# Patient Record
Sex: FEMALE | Race: WHITE | NOT HISPANIC OR LATINO | Employment: PART TIME | URBAN - NONMETROPOLITAN AREA
[De-identification: names, ages, dates, MRNs, and addresses within clinical notes are randomized per-mention and may not be internally consistent; named-entity substitution may affect disease eponyms.]

---

## 2023-08-03 ENCOUNTER — OFFICE VISIT (OUTPATIENT)
Dept: INTERNAL MEDICINE | Facility: CLINIC | Age: 38
End: 2023-08-03
Payer: COMMERCIAL

## 2023-08-03 VITALS
SYSTOLIC BLOOD PRESSURE: 140 MMHG | RESPIRATION RATE: 16 BRPM | WEIGHT: 208 LBS | DIASTOLIC BLOOD PRESSURE: 81 MMHG | HEART RATE: 91 BPM | BODY MASS INDEX: 38.28 KG/M2 | HEIGHT: 62 IN | OXYGEN SATURATION: 99 % | TEMPERATURE: 98.1 F

## 2023-08-03 DIAGNOSIS — E66.01 CLASS 2 SEVERE OBESITY DUE TO EXCESS CALORIES WITH SERIOUS COMORBIDITY AND BODY MASS INDEX (BMI) OF 39.0 TO 39.9 IN ADULT: Primary | ICD-10-CM

## 2023-08-03 DIAGNOSIS — R03.0 ELEVATED BLOOD PRESSURE READING: ICD-10-CM

## 2023-08-03 DIAGNOSIS — R60.0 BILATERAL LOWER EXTREMITY EDEMA: ICD-10-CM

## 2023-08-03 PROCEDURE — 99213 OFFICE O/P EST LOW 20 MIN: CPT | Performed by: NURSE PRACTITIONER

## 2023-08-03 RX ORDER — HYDROCHLOROTHIAZIDE 12.5 MG/1
12.5 TABLET ORAL DAILY PRN
Qty: 90 TABLET | Refills: 0 | Status: SHIPPED | OUTPATIENT
Start: 2023-08-03

## 2023-08-03 NOTE — PROGRESS NOTES
Chief Complaint / Reason:      Chief Complaint   Patient presents with    Obesity     Discuss medications     Edema     Bilateral legs        Subjective     HPI  Patient presents today with complaints of obesity and would like to discuss medication options.  Patient states that she has tried for some time to lose weight but nothing seems to help.  Patient's BMI is 39.14.  Patient does report some lower extremity edema and blood pressure is elevated.  She denies chest pain, shortness of breath or heart palpitations.  History taken from: patient    PMH/FH/Social History were reviewed and updated appropriately in the electronic medical record.   Past Medical History:   Diagnosis Date    Anxiety     Bipolar 1 disorder     Hypothyroidism     Migraine     Shingles      Past Surgical History:   Procedure Laterality Date     SECTION       Social History     Socioeconomic History    Marital status:    Tobacco Use    Smoking status: Never    Smokeless tobacco: Never   Vaping Use    Vaping Use: Every day   Substance and Sexual Activity    Drug use: Yes     Types: Methamphetamines, Benzodiazepines, Heroin, Hydrocodone, Cocaine(coke), Marijuana     Comment: history of  IV drug use and snorting     Sexual activity: Defer     Family History   Problem Relation Age of Onset    Diabetes Mother     Other Mother     Diabetes Father        Review of Systems:   Review of Systems      All other systems were reviewed and are negative.  Exceptions are noted in the subjective or above.      Objective     Vital Signs  Vitals:    23 1610   BP: 140/81   Pulse: 91   Resp: 16   Temp: 98.1 øF (36.7 øC)   SpO2: 99%       Body mass index is 38.66 kg/mý.          Physical Exam  Vitals and nursing note reviewed.   Constitutional:       General: She is not in acute distress.     Appearance: She is well-developed. She is obese.   Cardiovascular:      Rate and Rhythm: Normal rate and regular rhythm.      Pulses: Normal pulses.       Heart sounds: Normal heart sounds.   Pulmonary:      Effort: Pulmonary effort is normal.      Breath sounds: Normal breath sounds. No wheezing.   Chest:      Chest wall: No tenderness.   Musculoskeletal:      Right lower leg: Edema present.      Left lower leg: Edema present.   Skin:     General: Skin is warm and dry.      Capillary Refill: Capillary refill takes less than 2 seconds.      Coloration: Skin is not pale.      Findings: No erythema or rash.   Neurological:      Mental Status: She is alert and oriented to person, place, and time.   Psychiatric:         Behavior: Behavior normal.         Thought Content: Thought content normal.         Judgment: Judgment normal.            Results Review:    I reviewed the patient's new clinical results.       Medication Review:     Current Outpatient Medications:     buPROPion XL (Wellbutrin XL) 150 MG 24 hr tablet, Take 1 tablet by mouth Every Morning., Disp: 30 tablet, Rfl: 2    Cariprazine HCl (Vraylar) 1.5 MG capsule capsule, Take 1 capsule by mouth Daily., Disp: 30 capsule, Rfl: 2    clonazePAM (KlonoPIN) 0.5 MG tablet, Take 1 tablet by mouth Daily As Needed for Anxiety., Disp: 15 tablet, Rfl: 2    Methylphenidate HCl ER, PM, (Jornay PM) 40 MG capsule sustained-release 24 hr, Take 40 mg by mouth Every Night., Disp: 30 capsule, Rfl: 0    traZODone (DESYREL) 50 MG tablet, Take 2 tablets by mouth Every Night., Disp: 60 tablet, Rfl: 2    vitamin D (ERGOCALCIFEROL) 1.25 MG (25062 UT) capsule capsule, Take 1 capsule by mouth 1 (One) Time Per Week., Disp: 12 capsule, Rfl: 0    Diagnoses and all orders for this visit:    Class 2 severe obesity due to excess calories with serious comorbidity and body mass index (BMI) of 39.0 to 39.9 in adult  Patient's (Body mass index is 38.66 kg/mý.) indicates that they are obese (BMI >30) with health related conditions that include hypertension, dyslipidemias, and GERD . Weight is unchanged. BMI is is above average; BMI management plan is  completed. We discussed low calorie, low carb based diet program, portion control, increasing exercise, joining a fitness center or start home based exercise program, Weight Watchers or other Commercial based weight reduction program, and management of depression/anxiety/stress to control compensatory eating.     Elevated blood pressure reading  -     hydroCHLOROthiazide (HYDRODIURIL) 12.5 MG tablet; Take 1 tablet by mouth Daily As Needed (swelling in lower extremities).  Initiate lifestyle modifications.   DASH Diet and exercise.   Compliance with medication regimen and discussed ways to prevent of long-term complications from high blood pressure.  Discussed when to seek medical attention.  Encouraged patient to take blood pressure daily and keep a log.    Bilateral lower extremity edema  -     hydroCHLOROthiazide (HYDRODIURIL) 12.5 MG tablet; Take 1 tablet by mouth Daily As Needed (swelling in lower extremities).  Advised patient to closely monitor blood pressure and elevate lower extremities recommend watching salt and caffeine intake and do daily weights.        Return in about 4 weeks (around 8/31/2023), or if symptoms worsen or fail to improve.    Ayesha Mathew, MAX  08/03/2023

## 2023-08-15 DIAGNOSIS — F41.1 GENERALIZED ANXIETY DISORDER: ICD-10-CM

## 2023-08-15 RX ORDER — CLONAZEPAM 0.5 MG/1
0.5 TABLET ORAL DAILY PRN
Qty: 15 TABLET | Refills: 2 | Status: SHIPPED | OUTPATIENT
Start: 2023-08-15

## 2023-08-15 NOTE — TELEPHONE ENCOUNTER
Rx Refill Note  Requested Prescriptions     Pending Prescriptions Disp Refills    clonazePAM (KlonoPIN) 0.5 MG tablet 15 tablet 2     Sig: Take 1 tablet by mouth Daily As Needed for Anxiety.      Last office visit with prescribing clinician: 6/6/2023   Last telemedicine visit with prescribing clinician: Visit date not found   Next office visit with prescribing clinician: 8/15/2023                         Would you like a call back once the refill request has been completed: [] Yes [] No    If the office needs to give you a call back, can they leave a voicemail: [] Yes [] No    Karina De Souza MA  08/15/23, 09:26 EDT

## 2023-08-20 ENCOUNTER — HOSPITAL ENCOUNTER (EMERGENCY)
Facility: HOSPITAL | Age: 38
Discharge: HOME | End: 2023-08-20
Attending: EMERGENCY MEDICINE | Admitting: EMERGENCY MEDICINE
Payer: COMMERCIAL

## 2023-08-20 ENCOUNTER — APPOINTMENT (OUTPATIENT)
Dept: GENERAL RADIOLOGY | Facility: HOSPITAL | Age: 38
End: 2023-08-20
Payer: COMMERCIAL

## 2023-08-20 VITALS
HEIGHT: 62 IN | OXYGEN SATURATION: 98 % | TEMPERATURE: 97.7 F | BODY MASS INDEX: 39.38 KG/M2 | SYSTOLIC BLOOD PRESSURE: 133 MMHG | RESPIRATION RATE: 18 BRPM | HEART RATE: 64 BPM | DIASTOLIC BLOOD PRESSURE: 91 MMHG | WEIGHT: 214 LBS

## 2023-08-20 DIAGNOSIS — M25.572 ACUTE LEFT ANKLE PAIN: Primary | ICD-10-CM

## 2023-08-20 PROCEDURE — 99282 EMERGENCY DEPT VISIT SF MDM: CPT

## 2023-08-20 PROCEDURE — 73610 X-RAY EXAM OF ANKLE: CPT

## 2023-08-20 PROCEDURE — 73630 X-RAY EXAM OF FOOT: CPT

## 2023-08-20 PROCEDURE — 96372 THER/PROPH/DIAG INJ SC/IM: CPT

## 2023-08-20 PROCEDURE — 25010000002 KETOROLAC TROMETHAMINE PER 15 MG: Performed by: EMERGENCY MEDICINE

## 2023-08-20 RX ORDER — KETOROLAC TROMETHAMINE 30 MG/ML
60 INJECTION, SOLUTION INTRAMUSCULAR; INTRAVENOUS ONCE
Status: COMPLETED | OUTPATIENT
Start: 2023-08-20 | End: 2023-08-20

## 2023-08-20 RX ORDER — KETOROLAC TROMETHAMINE 10 MG/1
10 TABLET, FILM COATED ORAL EVERY 8 HOURS
Qty: 9 TABLET | Refills: 0 | Status: SHIPPED | OUTPATIENT
Start: 2023-08-20 | End: 2023-08-23

## 2023-08-20 RX ADMIN — KETOROLAC TROMETHAMINE 60 MG: 30 INJECTION, SOLUTION INTRAMUSCULAR at 08:29

## 2023-08-20 NOTE — ED PROVIDER NOTES
TRIAGE CHIEF COMPLAINT:     Nursing and triage notes reviewed    Chief Complaint   Patient presents with    Foot Pain      HPI: Sarita Pace is a 37 y.o. female who presents to the emergency department complaining of left foot pain.  Patient states she has pain at the back of her left heel primarily when she flexes her ankle or walks.  The pain began yesterday.  She denies having a rash or swelling.  She denies any trauma or injury that she can recall.    REVIEW OF SYSTEMS: All other systems reviewed and are negative     PAST MEDICAL HISTORY:   Past Medical History:   Diagnosis Date    Anxiety     Bipolar 1 disorder     Hypothyroidism     Migraine     Shingles         FAMILY HISTORY:   Family History   Problem Relation Age of Onset    Diabetes Mother     Other Mother     Diabetes Father         SOCIAL HISTORY:   Social History     Socioeconomic History    Marital status:    Tobacco Use    Smoking status: Never    Smokeless tobacco: Never   Vaping Use    Vaping Use: Every day   Substance and Sexual Activity    Drug use: Yes     Types: Methamphetamines, Benzodiazepines, Heroin, Hydrocodone, Cocaine(coke), Marijuana     Comment: history of  IV drug use and snorting     Sexual activity: Defer        SURGICAL HISTORY:   Past Surgical History:   Procedure Laterality Date     SECTION          CURRENT MEDICATIONS:      Medication List        ASK your doctor about these medications      buPROPion  MG 24 hr tablet  Commonly known as: Wellbutrin XL  Take 1 tablet by mouth Every Morning.     Cariprazine HCl 1.5 MG capsule capsule  Commonly known as: Vraylar  Take 1 capsule by mouth Daily.     clonazePAM 0.5 MG tablet  Commonly known as: KlonoPIN  Take 1 tablet by mouth Daily As Needed for Anxiety.     hydroCHLOROthiazide 12.5 MG tablet  Commonly known as: HYDRODIURIL  Take 1 tablet by mouth Daily As Needed (swelling in lower extremities).     Jornay PM 40 MG capsule sustained-release 24  hr  Generic drug: Methylphenidate HCl ER (PM)  Take 40 mg by mouth Every Night.     traZODone 50 MG tablet  Commonly known as: DESYREL  Take 2 tablets by mouth Every Night.     vitamin D 1.25 MG (80520 UT) capsule capsule  Commonly known as: ERGOCALCIFEROL  Take 1 capsule by mouth 1 (One) Time Per Week.               ALLERGIES: Patient has no known allergies.     PHYSICAL EXAM:   VITAL SIGNS:   Vitals:    08/20/23 0746   BP: 135/90   Pulse: 80   Resp: 18   Temp: 97.7 øF (36.5 øC)   SpO2: 99%      CONSTITUTIONAL: Awake, oriented, appears nontoxic   HENT: Atraumatic, normocephalic, oral mucosa pink and moist, airway patent. Nares patent without drainage. External ears normal.   EYES: Conjunctivae clear  NECK: Trachea midline   CARDIOVASCULAR: Normal heart rate, Normal rhythm, No murmurs, rubs, gallops   PULMONARY/CHEST: Clear to auscultation, no rhonchi, wheezes, or rales. Symmetrical breath sounds.  ABDOMINAL: Nondistended, soft, nontender - no rebound or guarding.   NEUROLOGIC: Nonfocal, moving all four extremities, no gross sensory or motor deficits.   EXTREMITIES: No clubbing, cyanosis, or edema.  There is no significant swelling at the left foot or ankle.  There is no bony tenderness along the heel or the back of the foot.  There is mild discomfort with range of motion.  There is no pain with squeezing of the calf muscle causing plantarflexion.  Mild pain with dorsiflexion of the foot immediately posterior to the calcaneus.  No tenderness at all in the calf.  There is no swelling or bruising or redness.  SKIN: Warm, Dry, No erythema, No rash     ED COURSE / MEDICAL DECISION MAKING:   Sarita Pace is a 37 y.o. female who presents to the emergency department for evaluation of pain in the left heel.  Patient is nondistressed on arrival in the emergency department.  Vital signs are stable.  Exam does not reveal any obvious bony tenderness.  There is mild pain at dorsiflexion immediately posterior to the  calcaneus.  There is no pain in the calf.  There is no swelling, bruising, redness.    Differential diagnosis includes sprain, contusion, fracture among other etiologies.    Strays of the left foot and ankle was ordered for further evaluation of the patient's presentation.    Diagnostic information from other sources: Chart review    Interventions: Toradol    Narrative: Patient presents with left heel and foot discomfort.  Patient appears well.  No tenderness in the calf including no swelling or pain with palpation in this area so I have no suspicion for a DVT at this time.  There is no overlying erythema or swelling so I have no suspicion of infection.  Pain primarily with dorsiflexion and with walking.  I suspect likely sprain.  X-rays obtained and per radiology interpretation have not revealed any obvious acute abnormality to the bony structures of the foot or ankle.  Discussed all of this with patient.  Will treat symptomatically very strict return precautions.      DECISION TO DISCHARGE/ADMIT: see ED care timeline     FINAL IMPRESSION:   1 --ankle pain  2 --   3 --     Electronically signed by: Therese Walden MD, 8/20/2023 08:26 Therese Kovacs MD  08/20/23 2725

## 2023-08-31 ENCOUNTER — OFFICE VISIT (OUTPATIENT)
Dept: INTERNAL MEDICINE | Facility: CLINIC | Age: 38
End: 2023-08-31
Payer: COMMERCIAL

## 2023-08-31 VITALS
WEIGHT: 204 LBS | DIASTOLIC BLOOD PRESSURE: 84 MMHG | SYSTOLIC BLOOD PRESSURE: 126 MMHG | HEIGHT: 62 IN | OXYGEN SATURATION: 99 % | TEMPERATURE: 97.6 F | BODY MASS INDEX: 37.54 KG/M2 | HEART RATE: 98 BPM

## 2023-08-31 DIAGNOSIS — R03.0 ELEVATED BLOOD PRESSURE READING: Primary | ICD-10-CM

## 2023-08-31 DIAGNOSIS — R60.0 BILATERAL LOWER EXTREMITY EDEMA: ICD-10-CM

## 2023-08-31 PROCEDURE — 99213 OFFICE O/P EST LOW 20 MIN: CPT | Performed by: NURSE PRACTITIONER

## 2023-08-31 PROCEDURE — 1160F RVW MEDS BY RX/DR IN RCRD: CPT | Performed by: NURSE PRACTITIONER

## 2023-08-31 PROCEDURE — 1159F MED LIST DOCD IN RCRD: CPT | Performed by: NURSE PRACTITIONER

## 2023-08-31 NOTE — PROGRESS NOTES
Chief Complaint / Reason:      Chief Complaint   Patient presents with    Anxiety     Swelling has been Improving with meds. BP is under control.        Subjective     Anxiety      Patient states that her anxiety is up a little bit as she started a new job and having to deal with the public but she does see psych.  Denies SI or HI.  Patient presents today for follow-up regarding elevated blood pressure and swelling and states the medicine is doing well she states that she is not having to take it every day but she is able to move her rings on her finger and does not feel so swollen.  She denies chest pain, shortness of breath or heart palpitations.  History taken from: patient    PMH/FH/Social History were reviewed and updated appropriately in the electronic medical record.   Past Medical History:   Diagnosis Date    Anxiety     Bipolar 1 disorder     Hypothyroidism     Migraine     Shingles      Past Surgical History:   Procedure Laterality Date     SECTION       Social History     Socioeconomic History    Marital status:    Tobacco Use    Smoking status: Never    Smokeless tobacco: Never   Vaping Use    Vaping Use: Every day   Substance and Sexual Activity    Drug use: Yes     Types: Methamphetamines, Benzodiazepines, Heroin, Hydrocodone, Cocaine(coke), Marijuana     Comment: history of  IV drug use and snorting     Sexual activity: Defer     Family History   Problem Relation Age of Onset    Diabetes Mother     Other Mother     Diabetes Father        Review of Systems:   Review of Systems      All other systems were reviewed and are negative.  Exceptions are noted in the subjective or above.      Objective     Vital Signs  Vitals:    23 1033   BP: 126/84   Pulse: 98   Temp: 97.6 øF (36.4 øC)   SpO2: 99%       Body mass index is 37.3 kg/mý.          Physical Exam  Vitals and nursing note reviewed.   Constitutional:       General: She is not in acute distress.     Appearance: She is  well-developed. She is obese.   Cardiovascular:      Rate and Rhythm: Normal rate and regular rhythm.      Pulses: Normal pulses.      Heart sounds: Normal heart sounds.   Pulmonary:      Effort: Pulmonary effort is normal.      Breath sounds: Normal breath sounds. No wheezing.   Chest:      Chest wall: No tenderness.   Skin:     General: Skin is warm and dry.      Capillary Refill: Capillary refill takes less than 2 seconds.      Coloration: Skin is not pale.      Findings: No erythema or rash.   Neurological:      Mental Status: She is alert and oriented to person, place, and time.   Psychiatric:         Behavior: Behavior normal.         Thought Content: Thought content normal.         Judgment: Judgment normal.            Results Review:    I reviewed the patient's new clinical results.       Medication Review:     Current Outpatient Medications:     buPROPion XL (Wellbutrin XL) 150 MG 24 hr tablet, Take 1 tablet by mouth Every Morning., Disp: 30 tablet, Rfl: 2    Cariprazine HCl (Vraylar) 1.5 MG capsule capsule, Take 1 capsule by mouth Daily., Disp: 30 capsule, Rfl: 2    clonazePAM (KlonoPIN) 0.5 MG tablet, Take 1 tablet by mouth Daily As Needed for Anxiety., Disp: 15 tablet, Rfl: 2    hydroCHLOROthiazide (HYDRODIURIL) 12.5 MG tablet, Take 1 tablet by mouth Daily As Needed (swelling in lower extremities)., Disp: 90 tablet, Rfl: 0    Methylphenidate HCl ER, PM, (Jornay PM) 40 MG capsule sustained-release 24 hr, Take 40 mg by mouth Every Night., Disp: 30 capsule, Rfl: 0    traZODone (DESYREL) 50 MG tablet, Take 2 tablets by mouth Every Night., Disp: 60 tablet, Rfl: 2    vitamin D (ERGOCALCIFEROL) 1.25 MG (68220 UT) capsule capsule, Take 1 capsule by mouth 1 (One) Time Per Week., Disp: 12 capsule, Rfl: 0    Diagnoses and all orders for this visit:    Elevated blood pressure reading    Bilateral lower extremity edema    Improved blood pressure and swelling      Return if symptoms worsen or fail to  improve.    Ayesha Mathew, APRN  08/31/2023

## 2023-09-07 ENCOUNTER — OFFICE VISIT (OUTPATIENT)
Dept: BEHAVIORAL HEALTH | Facility: CLINIC | Age: 38
End: 2023-09-07
Payer: COMMERCIAL

## 2023-09-07 VITALS — WEIGHT: 203 LBS | BODY MASS INDEX: 37.36 KG/M2 | HEIGHT: 62 IN

## 2023-09-07 DIAGNOSIS — F51.04 PSYCHOPHYSIOLOGICAL INSOMNIA: ICD-10-CM

## 2023-09-07 DIAGNOSIS — Z51.81 ENCOUNTER FOR THERAPEUTIC DRUG MONITORING: ICD-10-CM

## 2023-09-07 DIAGNOSIS — F90.2 ATTENTION DEFICIT HYPERACTIVITY DISORDER, COMBINED TYPE: ICD-10-CM

## 2023-09-07 DIAGNOSIS — F41.1 GENERALIZED ANXIETY DISORDER: Primary | ICD-10-CM

## 2023-09-07 DIAGNOSIS — F31.9 BIPOLAR 1 DISORDER: ICD-10-CM

## 2023-09-07 RX ORDER — TRAZODONE HYDROCHLORIDE 50 MG/1
100 TABLET ORAL NIGHTLY
Qty: 60 TABLET | Refills: 1 | Status: SHIPPED | OUTPATIENT
Start: 2023-09-07

## 2023-09-07 RX ORDER — BUPROPION HYDROCHLORIDE 150 MG/1
150 TABLET ORAL EVERY MORNING
Qty: 30 TABLET | Refills: 1 | Status: SHIPPED | OUTPATIENT
Start: 2023-09-07

## 2023-09-07 RX ORDER — HYDROXYZINE HYDROCHLORIDE 25 MG/1
25 TABLET, FILM COATED ORAL NIGHTLY PRN
Qty: 30 TABLET | Refills: 1 | Status: SHIPPED | OUTPATIENT
Start: 2023-09-07

## 2023-09-07 RX ORDER — METHYLPHENIDATE HYDROCHLORIDE 40 MG/1
40 CAPSULE ORAL NIGHTLY
Qty: 30 CAPSULE | Refills: 0 | Status: SHIPPED | OUTPATIENT
Start: 2023-09-07

## 2023-09-07 RX ORDER — CLONAZEPAM 0.5 MG/1
0.5 TABLET ORAL DAILY PRN
Qty: 30 TABLET | Refills: 1 | Status: SHIPPED | OUTPATIENT
Start: 2023-09-07

## 2023-09-07 NOTE — PROGRESS NOTES
Follow Up Office Visit    Patient Name: Sarita Pace  : 1985   MRN: [id number]   Care Team: Patient Care Team:  Ayesha Mathew APRN as PCP - General (Family Medicine)  Belkys Barber APRN as Nurse Practitioner (Behavioral Health)         Chief Complaint:    Chief Complaint   Patient presents with    ADHD    Anxiety    Sleeping Problem    Bipolar    Med Management       History of Present Illness: Sarita Pace is a 37 y.o. female who is here today for a medication management follow up. Patient states that overall medication continues to be effective. She has started a new job and states that has been causing some increased anxiety and she has been having a hard time sleeping. She does feel that her focus and concentration are good and her mood is stable for the most part. She denies SI/HI today.     The following portion of the patient's history were reviewed and updated appropriately: allergies, current and past medications, family history, medical history and social history.  Subjective   Review of Systems:    Review of Systems   Psychiatric/Behavioral:  Positive for sleep disturbance and stress. The patient is nervous/anxious.    All other systems reviewed and are negative.    Current Medications:   Current Outpatient Medications   Medication Sig Dispense Refill    buPROPion XL (Wellbutrin XL) 150 MG 24 hr tablet Take 1 tablet by mouth Every Morning. 30 tablet 1    Cariprazine HCl (Vraylar) 1.5 MG capsule capsule Take 1 capsule by mouth Daily. 30 capsule 1    clonazePAM (KlonoPIN) 0.5 MG tablet Take 1 tablet by mouth Daily As Needed for Anxiety. 30 tablet 1    hydroCHLOROthiazide (HYDRODIURIL) 12.5 MG tablet Take 1 tablet by mouth Daily As Needed (swelling in lower extremities). 90 tablet 0    Methylphenidate HCl ER, PM, (Jornay PM) 40 MG capsule sustained-release 24 hr Take 40 mg by mouth Every Night. 30 capsule 0    traZODone (DESYREL) 50 MG tablet Take 2 tablets by  mouth Every Night. 60 tablet 1    vitamin D (ERGOCALCIFEROL) 1.25 MG (25604 UT) capsule capsule Take 1 capsule by mouth 1 (One) Time Per Week. 12 capsule 0    hydrOXYzine (ATARAX) 25 MG tablet Take 1 tablet by mouth At Night As Needed (Anxiety and/or sleep). 30 tablet 1     No current facility-administered medications for this visit.       Mental Status Exam:   Hygiene:   good  Cooperation:  Cooperative  Eye Contact:  Good  Psychomotor Behavior:  Appropriate  Affect:  Appropriate  Mood: normal and anxious  Speech:  Normal  Thought Process:  Goal directed and Linear  Thought Content:  Normal and Mood congruent  Suicidal:  None  Homicidal:  None  Hallucinations:  None  Delusion:  None  Memory:  Intact  Orientation:  Person, Place, Time, and Situation  Reliability:  good  Insight:  Good  Judgement:  Good  Impulse Control:  Good  Physical/Medical Issues:  Yes see chart      Objective   Vital Signs:   There were no vitals taken for this visit.      Assessment / Plan    Diagnoses and all orders for this visit:    1. Generalized anxiety disorder (Primary)  -     buPROPion XL (Wellbutrin XL) 150 MG 24 hr tablet; Take 1 tablet by mouth Every Morning.  Dispense: 30 tablet; Refill: 1  -     clonazePAM (KlonoPIN) 0.5 MG tablet; Take 1 tablet by mouth Daily As Needed for Anxiety.  Dispense: 30 tablet; Refill: 1  -     hydrOXYzine (ATARAX) 25 MG tablet; Take 1 tablet by mouth At Night As Needed (Anxiety and/or sleep).  Dispense: 30 tablet; Refill: 1    2. Bipolar 1 disorder  -     Cariprazine HCl (Vraylar) 1.5 MG capsule capsule; Take 1 capsule by mouth Daily.  Dispense: 30 capsule; Refill: 1    3. Attention deficit hyperactivity disorder, combined type  -     Methylphenidate HCl ER, PM, (Jornay PM) 40 MG capsule sustained-release 24 hr; Take 40 mg by mouth Every Night.  Dispense: 30 capsule; Refill: 0  -     Compliance Drug Analysis, Ur - Urine, Clean Catch    4. Psychophysiological insomnia  -     traZODone (DESYREL) 50 MG  tablet; Take 2 tablets by mouth Every Night.  Dispense: 60 tablet; Refill: 1  -     hydrOXYzine (ATARAX) 25 MG tablet; Take 1 tablet by mouth At Night As Needed (Anxiety and/or sleep).  Dispense: 30 tablet; Refill: 1    5. Encounter for therapeutic drug monitoring  -     Compliance Drug Analysis, Ur - Urine, Clean Catch       Will add Hydroxyzine nightly and continue all other medication as ordered. Obtained yearly urine drug screen and controlled substance agreement signed.     A psychological evaluation was conducted in order to assess past and current level of functioning. Areas assessed included, but were not limited to: perception of social support, perception of ability to face and deal with challenges in life (positive functioning), anxiety symptoms, depressive symptoms, perspective on beliefs/belief system, coping skills for stress, intelligence level,  Therapeutic rapport was established. Interventions conducted today were geared towards incorporating medication management along with support for continued therapy. Education was also provided as to the med management with this provider and what to expect in subsequent sessions.      We discussed risks, benefits, goals and side effects of the above medication and the patient was agreeable with the plan. Patient was educated on the importance of compliance with treatment and follow-up appointments. Patient is aware to contact the [facility] with any worsening of symptoms. To call for questions or concerns and return early if necessary. Patent is agreeable to go to the Emergency Department or call 911 should they begin SI/HI.      PHQ-2/PHQ-9: Depression Screening  Little Interest or Pleasure in Doing Things: 0-->not at all  Feeling Down, Depressed or Hopeless: 0-->not at all  PHQ-2 Total Score: 0  PHQ-9: Brief Depression Severity Measure Score: 0      PHQ-9 Score:   PHQ-9 Total Score: 0    Over the last two weeks, how often have you been bothered by the  following problems?  Feeling nervous, anxious or on edge: More than half the days  Not being able to stop or control worrying: Several days  Worrying too much about different things: Several days  Trouble Relaxing: Not at all  Being so restless that it is hard to sit still: Not at all  Becoming easily annoyed or irritable: Not at all  Feeling afraid as if something awful might happen: Not at all  PONCE 7 Total Score: 4  If you checked any problems, how difficult have these problems made it for you to do your work, take care of things at home, or get along with other people: Somewhat difficult        Screening for Adults With ADHD - (1-6)  1. How often do you have trouble wrapping up the final details of a project, once the challenging parts have been done?: Never  2. How often do you have difficulty getting things in order when you have to do a task that requires organization?: Never  3. How often do you have problems remembering appointments or obligations : Never  4. When you have a task that requires a lot of thought, how often do you avoid or delay getting started ?: Never  5. How often do you fidget or squirm with your hands or feet when you have to sit down for a long time?: Often  6. How often do you feel overly active and compelled to do things, like you were driven by a motor?: Rarely  7. How often do you make careless mistakes when you have to work on a boring or difficult project?: Never  8. How often do have difficulty keeping your attention when you are doing boring or repetitive work?: Never  9. How often do you have difficulty concentrating on what people say to you, even when they are speaking to you: Never  10.How often do you misplace or have difficulty finding things at home or at work?: Never  11.How often are you distracted by activity or noise around you?: Rarely  12.How often do you leave your seat in meetings or other situations in which you are expected to remain seated?: Never  13.How often  do you feel restless or fidgety?: Rarely  14.How often do you have difficulty unwinding and relaxing when you have time to yourself?: Never  15.How often do you find yourself talking too much when you are in social situations?: Never  16.When you’re in a conversation, how often do you find yourself finishing the sentences of the people you are talking to, before they can finish them themselves?: Never  17.How often do you have difficulty waiting your turn in situations when turn taking is required?: Never  18.How often do you interrupt others when they are busy?: Never        MEDS ORDERED DURING VISIT:  New Medications Ordered This Visit   Medications    buPROPion XL (Wellbutrin XL) 150 MG 24 hr tablet     Sig: Take 1 tablet by mouth Every Morning.     Dispense:  30 tablet     Refill:  1    Cariprazine HCl (Vraylar) 1.5 MG capsule capsule     Sig: Take 1 capsule by mouth Daily.     Dispense:  30 capsule     Refill:  1    clonazePAM (KlonoPIN) 0.5 MG tablet     Sig: Take 1 tablet by mouth Daily As Needed for Anxiety.     Dispense:  30 tablet     Refill:  1    Methylphenidate HCl ER, PM, (Jornay PM) 40 MG capsule sustained-release 24 hr     Sig: Take 40 mg by mouth Every Night.     Dispense:  30 capsule     Refill:  0    traZODone (DESYREL) 50 MG tablet     Sig: Take 2 tablets by mouth Every Night.     Dispense:  60 tablet     Refill:  1    hydrOXYzine (ATARAX) 25 MG tablet     Sig: Take 1 tablet by mouth At Night As Needed (Anxiety and/or sleep).     Dispense:  30 tablet     Refill:  1         Follow Up   Return in about 8 weeks (around 11/2/2023).  Patient was given instructions and counseling regarding her condition or for health maintenance advice. Please see specific information pulled into the AVS if appropriate.     TREATMENT PLAN/GOALS: Continue supportive psychotherapy efforts and medications as indicated. Treatment and medication options discussed during today's visit. Patient acknowledged and verbally  consented to continue with current treatment plan and was educated on the importance of compliance with treatment and follow-up appointments.    MEDICATION ISSUES:  Discussed medication options and treatment plan of prescribed medication as well as the risks, benefits, and side effects including potential falls, possible impaired driving and metabolic adversities among others. Patient is agreeable to call the office with any worsening of symptoms or onset of side effects. Patient is agreeable to call 911 or go to the nearest ER should he/she begin having SI/HI.        [facility]  [address] [place] [id number]  [phone]    September 7, 2023 08:20 EDT

## 2023-09-14 LAB — DRUGS UR: NORMAL

## 2023-11-06 DIAGNOSIS — F90.2 ATTENTION DEFICIT HYPERACTIVITY DISORDER, COMBINED TYPE: ICD-10-CM

## 2023-11-06 DIAGNOSIS — F41.1 GENERALIZED ANXIETY DISORDER: ICD-10-CM

## 2023-11-06 RX ORDER — CLONAZEPAM 0.5 MG/1
0.5 TABLET ORAL DAILY PRN
Qty: 30 TABLET | Refills: 1 | Status: SHIPPED | OUTPATIENT
Start: 2023-11-06

## 2023-11-06 RX ORDER — METHYLPHENIDATE HYDROCHLORIDE 40 MG/1
40 CAPSULE ORAL NIGHTLY
Qty: 30 CAPSULE | Refills: 0 | Status: SHIPPED | OUTPATIENT
Start: 2023-11-06

## 2023-11-21 ENCOUNTER — OFFICE VISIT (OUTPATIENT)
Dept: BEHAVIORAL HEALTH | Facility: CLINIC | Age: 38
End: 2023-11-21
Payer: COMMERCIAL

## 2023-11-21 VITALS — WEIGHT: 198 LBS | BODY MASS INDEX: 36.44 KG/M2 | HEIGHT: 62 IN

## 2023-11-21 DIAGNOSIS — F31.9 BIPOLAR 1 DISORDER: ICD-10-CM

## 2023-11-21 DIAGNOSIS — F51.04 PSYCHOPHYSIOLOGICAL INSOMNIA: ICD-10-CM

## 2023-11-21 DIAGNOSIS — F90.2 ATTENTION DEFICIT HYPERACTIVITY DISORDER, COMBINED TYPE: Primary | ICD-10-CM

## 2023-11-21 DIAGNOSIS — F41.1 GENERALIZED ANXIETY DISORDER: ICD-10-CM

## 2023-11-21 PROCEDURE — 1160F RVW MEDS BY RX/DR IN RCRD: CPT

## 2023-11-21 PROCEDURE — 99214 OFFICE O/P EST MOD 30 MIN: CPT

## 2023-11-21 PROCEDURE — 1159F MED LIST DOCD IN RCRD: CPT

## 2023-11-21 NOTE — PROGRESS NOTES
Follow Up Office Visit    Patient Name: Sarita Pace  : 1985   MRN: [id number]   Care Team: Patient Care Team:  Ayesha Mathew APRN as PCP - General (Family Medicine)  Belkys Barber APRN as Nurse Practitioner (Behavioral Health)         Chief Complaint:    Chief Complaint   Patient presents with    ADHD    Bipolar    Anxiety    Sleeping Problem    Med Management       History of Present Illness: Sarita Pace is a 38 y.o. female who is here today for a medication management follow up. Patient reports that things have been going really well. She feels that her focus and task completion are doing good and her mood and anxiety are managed well. She also feels that she is sleeping good. She states that she has been doing so well she is ready to start weaning off medication. She denies SI/HI today.     The following portion of the patient's history were reviewed and updated appropriately: allergies, current and past medications, family history, medical history and social history.  Subjective   Review of Systems:    Review of Systems   All other systems reviewed and are negative.      Current Medications:   Current Outpatient Medications   Medication Sig Dispense Refill    hydroCHLOROthiazide (HYDRODIURIL) 12.5 MG tablet Take 1 tablet by mouth Daily As Needed (swelling in lower extremities). 90 tablet 0    vitamin D (ERGOCALCIFEROL) 1.25 MG (40471 UT) capsule capsule Take 1 capsule by mouth 1 (One) Time Per Week. 12 capsule 0     No current facility-administered medications for this visit.       Mental Status Exam:   Hygiene:   good  Cooperation:  Cooperative  Eye Contact:  Good  Psychomotor Behavior:  Appropriate  Affect:  Appropriate  Mood: normal  Speech:  Normal  Thought Process:  Goal directed and Linear  Thought Content:  Normal and Mood congruent  Suicidal:  None  Homicidal:  None  Hallucinations:  None  Delusion:  None  Memory:  Intact  Orientation:  Person, Place,  "Time, and Situation  Reliability:  fair  Insight:  Fair  Judgement:  Fair  Impulse Control:  Fair  Physical/Medical Issues:  Yes see chart       Objective   Vital Signs:   Ht 157.5 cm (62\")   Wt 89.8 kg (198 lb)   BMI 36.21 kg/m²       Assessment / Plan    Diagnoses and all orders for this visit:    1. Attention deficit hyperactivity disorder, combined type (Primary)    2. Bipolar 1 disorder    3. Generalized anxiety disorder    4. Psychophysiological insomnia       Discussed that often times patient's are doing well because of the medication and when we wean off they decline. She did not feel that her progress was medication related. She states she has started using THC and that has been helping more than anything. Provided instructions on weaning off the Vraylar, Wellbutrin and Jornay. She had already stopped everything else. Will discontinue Klonopin, Hydroxyzine, Trazodone, Vraylar, Wellbutrin and Jornay.     A psychological evaluation was conducted in order to assess past and current level of functioning. Areas assessed included, but were not limited to: perception of social support, perception of ability to face and deal with challenges in life (positive functioning), anxiety symptoms, depressive symptoms, perspective on beliefs/belief system, coping skills for stress, intelligence level,  Therapeutic rapport was established. Interventions conducted today were geared towards incorporating medication management along with support for continued therapy. Education was also provided as to the med management with this provider and what to expect in subsequent sessions.      We discussed risks, benefits, goals and side effects of the above medication and the patient was agreeable with the plan. Patient was educated on the importance of compliance with treatment and follow-up appointments. Patient is aware to contact the [facility] with any worsening of symptoms. To call for questions or concerns and return " early if necessary. Patent is agreeable to go to the Emergency Department or call 911 should they begin SI/HI.      PHQ-2/PHQ-9: Depression Screening  Little Interest or Pleasure in Doing Things: 0-->not at all  Feeling Down, Depressed or Hopeless: 0-->not at all  PHQ-2 Total Score: 0  PHQ-9: Brief Depression Severity Measure Score: 0      PHQ-9 Score:   PHQ-9 Total Score: 0      Over the last two weeks, how often have you been bothered by the following problems?  Feeling nervous, anxious or on edge: Not at all  Not being able to stop or control worrying: Not at all  Worrying too much about different things: Not at all  Trouble Relaxing: Not at all  Being so restless that it is hard to sit still: Not at all  Becoming easily annoyed or irritable: Not at all  Feeling afraid as if something awful might happen: Not at all  PONCE 7 Total Score: 0        Screening for Adults With ADHD - (1-6)  1. How often do you have trouble wrapping up the final details of a project, once the challenging parts have been done?: Rarely  2. How often do you have difficulty getting things in order when you have to do a task that requires organization?: Never  3. How often do you have problems remembering appointments or obligations : Never  4. When you have a task that requires a lot of thought, how often do you avoid or delay getting started ?: Never  5. How often do you fidget or squirm with your hands or feet when you have to sit down for a long time?: Never  6. How often do you feel overly active and compelled to do things, like you were driven by a motor?: Never  7. How often do you make careless mistakes when you have to work on a boring or difficult project?: Never  8. How often do have difficulty keeping your attention when you are doing boring or repetitive work?: Never  9. How often do you have difficulty concentrating on what people say to you, even when they are speaking to you: Never  10.How often do you misplace or have  difficulty finding things at home or at work?: Never  11.How often are you distracted by activity or noise around you?: Never  12.How often do you leave your seat in meetings or other situations in which you are expected to remain seated?: Never  13.How often do you feel restless or fidgety?: Never  14.How often do you have difficulty unwinding and relaxing when you have time to yourself?: Never  15.How often do you find yourself talking too much when you are in social situations?: Never  16.When you’re in a conversation, how often do you find yourself finishing the sentences of the people you are talking to, before they can finish them themselves?: Never  17.How often do you have difficulty waiting your turn in situations when turn taking is required?: Never  18.How often do you interrupt others when they are busy?: Never        MEDS ORDERED DURING VISIT:  No orders of the defined types were placed in this encounter.        Follow Up   Return in about 8 weeks (around 1/16/2024).  Patient was given instructions and counseling regarding her condition or for health maintenance advice. Please see specific information pulled into the AVS if appropriate.     TREATMENT PLAN/GOALS: Continue supportive psychotherapy efforts and medications as indicated. Treatment and medication options discussed during today's visit. Patient acknowledged and verbally consented to continue with current treatment plan and was educated on the importance of compliance with treatment and follow-up appointments.    MEDICATION ISSUES:  Discussed medication options and treatment plan of prescribed medication as well as the risks, benefits, and side effects including potential falls, possible impaired driving and metabolic adversities among others. Patient is agreeable to call the office with any worsening of symptoms or onset of side effects. Patient is agreeable to call 911 or go to the nearest ER should he/she begin having SI/HI.        Belkys  [facility]  [address] [address] [phone]    November 21, 2023 11:11 EST

## 2024-03-14 ENCOUNTER — OFFICE VISIT (OUTPATIENT)
Dept: INTERNAL MEDICINE | Facility: CLINIC | Age: 39
End: 2024-03-14
Payer: COMMERCIAL

## 2024-03-14 ENCOUNTER — TELEPHONE (OUTPATIENT)
Dept: BEHAVIORAL HEALTH | Facility: CLINIC | Age: 39
End: 2024-03-14
Payer: COMMERCIAL

## 2024-03-14 VITALS
TEMPERATURE: 97.2 F | DIASTOLIC BLOOD PRESSURE: 80 MMHG | BODY MASS INDEX: 38.64 KG/M2 | HEIGHT: 62 IN | SYSTOLIC BLOOD PRESSURE: 138 MMHG | OXYGEN SATURATION: 98 % | HEART RATE: 110 BPM | WEIGHT: 210 LBS

## 2024-03-14 DIAGNOSIS — R11.2 NAUSEA AND VOMITING, UNSPECIFIED VOMITING TYPE: ICD-10-CM

## 2024-03-14 DIAGNOSIS — R10.13 EPIGASTRIC PAIN: Primary | ICD-10-CM

## 2024-03-14 PROCEDURE — 99214 OFFICE O/P EST MOD 30 MIN: CPT | Performed by: NURSE PRACTITIONER

## 2024-03-14 PROCEDURE — 1159F MED LIST DOCD IN RCRD: CPT | Performed by: NURSE PRACTITIONER

## 2024-03-14 PROCEDURE — 1160F RVW MEDS BY RX/DR IN RCRD: CPT | Performed by: NURSE PRACTITIONER

## 2024-03-14 RX ORDER — HYDROXYZINE HYDROCHLORIDE 25 MG/1
25 TABLET, FILM COATED ORAL 2 TIMES DAILY PRN
Qty: 30 TABLET | Refills: 1 | Status: SHIPPED | OUTPATIENT
Start: 2024-03-14 | End: 2024-03-14

## 2024-03-14 RX ORDER — OMEPRAZOLE 40 MG/1
40 CAPSULE, DELAYED RELEASE ORAL DAILY
Qty: 90 CAPSULE | Refills: 0 | Status: SHIPPED | OUTPATIENT
Start: 2024-03-14

## 2024-03-14 RX ORDER — ONDANSETRON 4 MG/1
4 TABLET, ORALLY DISINTEGRATING ORAL EVERY 8 HOURS PRN
Qty: 30 TABLET | Refills: 0 | Status: SHIPPED | OUTPATIENT
Start: 2024-03-14

## 2024-03-14 NOTE — TELEPHONE ENCOUNTER
PT CALLED AND SCHEDULED APPT. SHE STATED  THAT SHE HAD STOP TAKING HER MEDICATION BUT SHE IS REALLY NEEDING IT. SHE WAS TAKING KLONOPIN 0.5 . I SCHEDULED HER FOR MAY 6 @ 11:45. PLEASE LET PT KNOW.

## 2024-03-14 NOTE — TELEPHONE ENCOUNTER
Patient having flare ups of anxiety, requesting a PRN medication to help with that. I informed her that she will need to be seen in order to prescribe any controlled substances again. Patient denies any thoughts of hurting herself or others.    Let her know I will discuss with Charmaine to see if we can send in a non controlled PRN medication and if there are any sooner appointments.

## 2024-03-17 NOTE — PROGRESS NOTES
Office Visit      Date: 2024   Patient Name: Sarita Pace  : 1985   MRN: [id number]     Chief Complaint:    Chief Complaint   Patient presents with    Abdominal Pain     Upper mid abdomen for a little over a Month.     Nausea    Bloated    Vomiting     Off and on       History of Present Illness: Sarita Pace is a 38 y.o. female presents for epigastric abdominal pain. Onset little over 1 month. Associated symptoms: nausea, intermittent vomiting, bloating. No diarrhea, constipation, melena, hematochezia, hematemesis. Characteristics: dull ache. Constant. No heartburn, reflux, fever, myalgia, chills. Diet is unhealthy. Drinks soda. Vapes daily.     Subjective      Review of Systems:   Pertinent ROS noted in HPI.     I have reviewed the patients family history, social history, past medical history, past surgical history and have updated it as appropriate.     Medications:     Current Outpatient Medications:     omeprazole (priLOSEC) 40 MG capsule, Take 1 capsule by mouth Daily., Disp: 90 capsule, Rfl: 0    ondansetron ODT (ZOFRAN-ODT) 4 MG disintegrating tablet, Place 1 tablet on the tongue Every 8 (Eight) Hours As Needed for Nausea or Vomiting., Disp: 30 tablet, Rfl: 0    Allergies:   No Known Allergies    Objective     Physical Exam  Physical Exam  Vitals and nursing note reviewed.   Constitutional:       General: She is not in acute distress.     Appearance: Normal appearance. She is obese.   HENT:      Right Ear: External ear normal.      Left Ear: External ear normal.   Eyes:      Extraocular Movements: Extraocular movements intact.   Cardiovascular:      Rate and Rhythm: Normal rate and regular rhythm.   Pulmonary:      Effort: Pulmonary effort is normal.      Breath sounds: Normal breath sounds.   Abdominal:      General: Bowel sounds are normal.      Palpations: Abdomen is soft.      Tenderness:  in the epigastric area There is no guarding or rebound. Negative signs  "include Aguero's sign and McBurney's sign.      Hernia: No hernia is present.   Musculoskeletal:         General: Normal range of motion.      Cervical back: Normal range of motion.   Skin:     General: Skin is dry.   Neurological:      Mental Status: She is alert and oriented to person, place, and time.   Psychiatric:         Mood and Affect: Mood normal.         Vital Signs:   Vitals:    03/14/24 1619   BP: 138/80   Pulse: 110   Temp: 97.2 °F (36.2 °C)   SpO2: 98%   Weight: 95.3 kg (210 lb)   Height: 157.5 cm (62\")   PainSc:   8     Body mass index is 38.41 kg/m².      Assessment / Plan      Assessment/Plan:   Problem List Items Addressed This Visit    None  Visit Diagnoses       Epigastric pain    -  Primary    Relevant Medications    omeprazole (priLOSEC) 40 MG capsule    Other Relevant Orders    US Gallbladder    Nausea and vomiting, unspecified vomiting type        Relevant Medications    ondansetron ODT (ZOFRAN-ODT) 4 MG disintegrating tablet    Other Relevant Orders    US Gallbladder              Differentials discussed; will treat for ulcer. Initiate omeprazole and zofran PRN. Take omeprazole first thing in AM 30 minutes prior to meals. Anti-reflux measures, trigger food and drinks to avoid; Fatty foods, alcohol, chocolate, coffee, tea, caffeinated soft drinks (decaffeinated coffee still has some caffeine), peppermint and spearmint, spices and vinegar, citrus fruits and juices, tomatoes and tomato sauces, and smoking. Other antireflux measures include weight reduction if overweight, avoid tight clothing around the abdomen, elevate the head of the bed 6 inches (may us a bed wedge which is placed between the mattress and box springs) or blocks under the heard of the bed. Don't drink or eat for 2 hours before going to bed and avoid lying down immediately after meals. US gallbladder ordered to r/o in case symptoms persist/worsen.       Follow Up:   Return in about 6 weeks (around 4/25/2024).        Sweta Paige " MAX  [facility]

## 2024-04-18 ENCOUNTER — HOSPITAL ENCOUNTER (OUTPATIENT)
Dept: ULTRASOUND IMAGING | Facility: HOSPITAL | Age: 39
Discharge: HOME | End: 2024-04-18
Admitting: NURSE PRACTITIONER
Payer: COMMERCIAL

## 2024-04-18 DIAGNOSIS — R11.2 NAUSEA AND VOMITING, UNSPECIFIED VOMITING TYPE: ICD-10-CM

## 2024-04-18 DIAGNOSIS — R10.13 EPIGASTRIC PAIN: ICD-10-CM

## 2024-04-18 PROCEDURE — 76705 ECHO EXAM OF ABDOMEN: CPT

## 2024-04-18 NOTE — PROGRESS NOTES
Gallbladder ultrasound is normal  During patient's appointment with me, we started her on omeprazole to see if this would help with her symptoms.  Is she continuing to have symptoms or are they better?  If she is continuing to have symptoms I would recommend a HIDA scan which will check the functionality of the gallbladder.

## 2024-05-06 ENCOUNTER — OFFICE VISIT (OUTPATIENT)
Dept: INTERNAL MEDICINE | Facility: CLINIC | Age: 39
End: 2024-05-06
Payer: COMMERCIAL

## 2024-05-06 VITALS
SYSTOLIC BLOOD PRESSURE: 134 MMHG | DIASTOLIC BLOOD PRESSURE: 84 MMHG | WEIGHT: 207 LBS | HEART RATE: 89 BPM | RESPIRATION RATE: 17 BRPM | TEMPERATURE: 98.3 F | HEIGHT: 62 IN | BODY MASS INDEX: 38.09 KG/M2 | OXYGEN SATURATION: 98 %

## 2024-05-06 DIAGNOSIS — R10.13 EPIGASTRIC PAIN: Primary | ICD-10-CM

## 2024-05-06 DIAGNOSIS — R03.0 ELEVATED BLOOD PRESSURE READING: ICD-10-CM

## 2024-05-06 DIAGNOSIS — R11.2 NAUSEA AND VOMITING, UNSPECIFIED VOMITING TYPE: ICD-10-CM

## 2024-05-06 DIAGNOSIS — K76.0 STEATOSIS OF LIVER: ICD-10-CM

## 2024-05-06 PROCEDURE — 1125F AMNT PAIN NOTED PAIN PRSNT: CPT | Performed by: NURSE PRACTITIONER

## 2024-05-06 PROCEDURE — 1159F MED LIST DOCD IN RCRD: CPT | Performed by: NURSE PRACTITIONER

## 2024-05-06 PROCEDURE — 99214 OFFICE O/P EST MOD 30 MIN: CPT | Performed by: NURSE PRACTITIONER

## 2024-05-06 PROCEDURE — 1160F RVW MEDS BY RX/DR IN RCRD: CPT | Performed by: NURSE PRACTITIONER

## 2024-05-06 NOTE — PROGRESS NOTES
Chief Complaint / Reason:      Chief Complaint   Patient presents with    Abstract     Discuss scans from gallbladder        Subjective     HPI    History taken from: patient    PMH/FH/Social History were reviewed and updated appropriately in the electronic medical record    The patient is a 38-year-old female who is here for follow-up and would like to discuss results from gallbladder. The patient's BMI is 37.6 kg/m2.    The patient is doing well.    The patient continues to experience gastrointestinal issues, characterized by a sensation akin to overeating, particularly upon awakening. She is currently on a daily regimen of Prilosec and Zofran as required.    The patient has been experiencing elevated blood pressure recently, which she attributes to stress. She has made significant dietary modifications, including a reduction in her caffeine intake from coffee to one Dr. Pepper per day, since her last visit.    Past Medical History:   Diagnosis Date    Anxiety     Bipolar 1 disorder     Hypothyroidism     Migraine     Shingles      Past Surgical History:   Procedure Laterality Date     SECTION       Social History     Socioeconomic History    Marital status:    Tobacco Use    Smoking status: Never    Smokeless tobacco: Never   Vaping Use    Vaping status: Every Day   Substance and Sexual Activity    Drug use: Yes     Types: Methamphetamines, Benzodiazepines, Heroin, Hydrocodone, Cocaine(coke), Marijuana     Comment: history of  IV drug use and snorting     Sexual activity: Defer     Family History   Problem Relation Age of Onset    Diabetes Mother     Other Mother     Diabetes Father        Review of Systems:   Review of Systems      All other systems were reviewed and are negative.  Exceptions are noted in the subjective or above.      Objective     Vital Signs  Vitals:    24 1717   BP: 134/84   Pulse: 89   Resp: 17   Temp: 98.3 °F (36.8 °C)   SpO2: 98%       Body mass index is 37.86  kg/m².  Class 2 Severe Obesity (BMI >=35 and <=39.9). Obesity-related health conditions include the following: dyslipidemias and GERD. Obesity is unchanged. BMI is is above average; BMI management plan is completed. We discussed low calorie, low carb based diet program, portion control, increasing exercise, joining a fitness center or start home based exercise program, Weight Watchers or other Commercial based weight reduction program, and management of depression/anxiety/stress to control compensatory eating.       Physical Exam  Vitals and nursing note reviewed.   Constitutional:       General: She is not in acute distress.     Appearance: She is well-developed.   Cardiovascular:      Rate and Rhythm: Normal rate and regular rhythm.      Pulses: Normal pulses.      Heart sounds: Normal heart sounds.   Pulmonary:      Effort: Pulmonary effort is normal.      Breath sounds: Normal breath sounds. No wheezing.   Chest:      Chest wall: No tenderness.   Abdominal:      General: Bowel sounds are normal.      Palpations: Abdomen is soft.      Tenderness: There is abdominal tenderness in the right upper quadrant and epigastric area. There is no right CVA tenderness, left CVA tenderness, guarding or rebound. Negative signs include Aguero's sign, Rovsing's sign, McBurney's sign, psoas sign and obturator sign.   Skin:     General: Skin is warm and dry.      Capillary Refill: Capillary refill takes less than 2 seconds.      Coloration: Skin is not pale.      Findings: No erythema or rash.   Neurological:      Mental Status: She is alert and oriented to person, place, and time.   Psychiatric:         Behavior: Behavior normal.         Thought Content: Thought content normal.         Judgment: Judgment normal.              Results Review:    I reviewed the patient's new clinical results.       Medication Review:     Current Outpatient Medications:     omeprazole (priLOSEC) 40 MG capsule, Take 1 capsule by mouth Daily., Disp: 90  capsule, Rfl: 0    ondansetron ODT (ZOFRAN-ODT) 4 MG disintegrating tablet, Place 1 tablet on the tongue Every 8 (Eight) Hours As Needed for Nausea or Vomiting., Disp: 30 tablet, Rfl: 0    Assessment & Plan   Diagnoses and all orders for this visit:    1. Epigastric pain (Primary)  -     Ambulatory Referral to Gastroenterology    2. Nausea and vomiting, unspecified vomiting type  -     Ambulatory Referral to Gastroenterology    3. Steatosis of liver  -     Ambulatory Referral to Gastroenterology    4. Elevated blood pressure reading      1. Nausea, vomiting, and epigastric pain.  - The patient is advised to adjust the timing of her Prilosec dosage to nighttime, and to revert to the previous dosage if ineffective. A referral to a gastroenterologist will be made for further evaluation.    2. Elevated blood pressure.  - The patient is advised to maintain a balanced diet, engage in regular exercise, and ensure adequate sleep.    Return if symptoms worsen or fail to improve.        Transcribed from ambient dictation for MAX Jiménez by Lani De Souza.  05/06/24   17:52 EDT    Patient or patient representative verbalized consent to the visit recording.  I have personally performed the services described in this document as transcribed by the above individual, and it is both accurate and complete.

## 2024-08-13 ENCOUNTER — OFFICE VISIT (OUTPATIENT)
Dept: GASTROENTEROLOGY | Facility: CLINIC | Age: 39
End: 2024-08-13
Payer: COMMERCIAL

## 2024-08-13 VITALS
SYSTOLIC BLOOD PRESSURE: 120 MMHG | BODY MASS INDEX: 36.58 KG/M2 | DIASTOLIC BLOOD PRESSURE: 80 MMHG | HEART RATE: 68 BPM | OXYGEN SATURATION: 98 % | WEIGHT: 200 LBS

## 2024-08-13 DIAGNOSIS — R10.13 EPIGASTRIC PAIN: Primary | Chronic | ICD-10-CM

## 2024-08-13 DIAGNOSIS — K21.9 GASTROESOPHAGEAL REFLUX DISEASE, UNSPECIFIED WHETHER ESOPHAGITIS PRESENT: Chronic | ICD-10-CM

## 2024-08-13 DIAGNOSIS — K76.0 FATTY (CHANGE OF) LIVER, NOT ELSEWHERE CLASSIFIED: Chronic | ICD-10-CM

## 2024-08-13 DIAGNOSIS — R11.2 NAUSEA AND VOMITING, UNSPECIFIED VOMITING TYPE: Chronic | ICD-10-CM

## 2024-08-13 DIAGNOSIS — E66.09 CLASS 2 OBESITY DUE TO EXCESS CALORIES WITHOUT SERIOUS COMORBIDITY WITH BODY MASS INDEX (BMI) OF 36.0 TO 36.9 IN ADULT: Chronic | ICD-10-CM

## 2024-08-13 DIAGNOSIS — F12.90 MARIJUANA USE: Chronic | ICD-10-CM

## 2024-08-13 PROBLEM — E66.812 CLASS 2 OBESITY DUE TO EXCESS CALORIES WITHOUT SERIOUS COMORBIDITY WITH BODY MASS INDEX (BMI) OF 36.0 TO 36.9 IN ADULT: Chronic | Status: ACTIVE | Noted: 2024-08-13

## 2024-08-13 RX ORDER — SODIUM CHLORIDE 9 MG/ML
70 INJECTION, SOLUTION INTRAVENOUS CONTINUOUS PRN
OUTPATIENT
Start: 2024-08-13

## 2024-08-13 NOTE — PROGRESS NOTES
New Patient Consult      Date: 2024   Patient Name: Sarita Pace  MRN: [id number]  : 1985     Primary Care Provider: Ayesha Mathew APRN    Chief Complaint   Patient presents with    Abdominal Pain     History of Present Illness: Sarita Pace is a 38 y.o. female who is here today to establish care with gastroenterology for abdominal pain.     She has been having upper abdominal pain with nausea since 2024 that occurs several times per week. She constantly feels like she has overeaten. She has had a few episodes of vomiting. Denies any hematemesis or melena. She has been on Omeprazole 40 mg daily since 2024 but it hasn't helped. She has occasional reflux for several years but it hasn't helped. Denies any NSAID use. She does smoke marijuana almost every weekend or every other weekend.    She denies any constipation, diarrhea or GI bleeding. She has not had a colonoscopy or EGD in the past. No family history of GI malignancy.     Subjective      Past Medical History:   Diagnosis Date    Anxiety     Bipolar 1 disorder     Hypothyroidism     Migraine     Shingles      Past Surgical History:   Procedure Laterality Date     SECTION      TONSILLECTOMY N/A     WISDOM TOOTH EXTRACTION N/A      Family History   Problem Relation Age of Onset    Diabetes Mother     Diabetes Father     Colon cancer Neg Hx      Social History     Socioeconomic History    Marital status:    Tobacco Use    Smoking status: Never    Smokeless tobacco: Never   Vaping Use    Vaping status: Every Day   Substance and Sexual Activity    Drug use: Yes     Types: Methamphetamines, Benzodiazepines, Heroin, Hydrocodone, Cocaine(coke), Marijuana     Comment: history of  IV drug use and snorting - currently uses marijuana    Sexual activity: Defer       Current Outpatient Medications:     omeprazole (priLOSEC) 40 MG capsule, Take 1 capsule by mouth Daily., Disp: 90 capsule, Rfl:  0    ondansetron ODT (ZOFRAN-ODT) 4 MG disintegrating tablet, Place 1 tablet on the tongue Every 8 (Eight) Hours As Needed for Nausea or Vomiting., Disp: 30 tablet, Rfl: 0     No Known Allergies    The following portions of the patient's history were reviewed and updated as appropriate: allergies, current medications, past family history, past medical history, past social history, past surgical history and problem list.    Objective     Physical Exam  Vitals and nursing note reviewed.   Constitutional:       General: She is not in acute distress.     Appearance: Normal appearance. She is well-developed.   HENT:      Head: Normocephalic and atraumatic.      Mouth/Throat:      Mouth: Mucous membranes are not pale, not dry and not cyanotic.   Eyes:      General: Lids are normal.   Neck:      Trachea: Trachea normal.   Cardiovascular:      Rate and Rhythm: Normal rate.   Pulmonary:      Effort: Pulmonary effort is normal. No respiratory distress.      Breath sounds: Normal breath sounds.   Abdominal:      General: Bowel sounds are normal.      Palpations: Abdomen is soft.      Tenderness: There is no abdominal tenderness.   Skin:     General: Skin is warm and dry.   Neurological:      Mental Status: She is alert and oriented to person, place, and time.   Psychiatric:         Mood and Affect: Mood normal.         Speech: Speech normal.         Behavior: Behavior normal. Behavior is cooperative.       Vitals:    08/13/24 0835   BP: 120/80   Pulse: 68   SpO2: 98%   Weight: 90.7 kg (200 lb)     Body mass index is 36.58 kg/m².     Results Review:   I have reviewed the patient's new clinical and imaging results.    No visits with results within 90 Day(s) from this visit.   Latest known visit with results is:   Office Visit on 09/07/2023   Component Date Value Ref Range Status    Report Summary 09/07/2023 FINAL   Final    Comment: ====================================================================  [facility] DRUG  ANALYSIS,[facility]  ====================================================================  Test                             Result       Flag       Units  Drug Present and Declared for Prescription Verification    7-aminoclonazepam              36           EXPECTED   ng/mg creat     7-aminoclonazepam is an expected metabolite of clonazepam. Source     of clonazepam is a scheduled prescription medication.    Methylphenidate                PRESENT      EXPECTED    Ritalinic Acid                 PRESENT      EXPECTED     Source of methylphenidate is a scheduled prescription medication.     Ritalinic acid is an expected metabolite of methylphenidate.    Bupropion                      PRESENT      EXPECTED    Hydroxybupropion               PRESENT      EXPECTED     Hydroxybupropion is an expected metabolite of bupropion.    Trazodone                      PRESENT      EXPECTED    1,3 chlorophenyl piperazine                               PRESENT      EXPECTED     1,3-chlorophenyl piperazine is an expected metabolite of     trazodone.  Drug Present not Declared for Prescription Verification    Carboxy-THC                    31           UNEXPECTED ng/mg creat     Carboxy-THC is a metabolite of tetrahydrocannabinol (THC). Source     of THC is most commonly herbal marijuana or marijuana-based     products, but THC is also present in a scheduled prescription     medication. Trace amounts of THC can be present in hemp and     cannabidiol (CBD) products. This test is not intended to     distinguish between delta-9-tetrahydrocannabinol, the predominant     form of THC in most herbal or marijuana-based products, and     delta-8-tetrahydrocannabinol.    Dextromethorphan               PRESENT      UNEXPECTED    Dextrorphan/Levorphanol        PRESENT      UNEXPECTED     Dextrorphan is an expected metabolite of dextromethorphan, an     over-the-counter or prescription cough suppressant. Dextrorphan     cannot be distinguished fro                            m the scheduled prescription     medication levorphanol by the method used for analysis.    Guaifenesin                    PRESENT      UNEXPECTED     Guaifenesin may be administered as an over-the-counter or     prescription drug; it may also be present as a breakdown product     of methocarbamol.  Drug Absent but Declared for Prescription Verification    Hydroxyzine                    Not Detected UNEXPECTED  ====================================================================  Test                      Result    Flag   Units      Ref Range    Creatinine              190              mg/dL      >=20  ====================================================================  Declared Medications:   The flagging and interpretation on this report are based on the   following declared medications.  Unexpected results may arise from   inaccuracies in the declared medications.   **Note: The testing scope of this panel includes these medications:   Bupropion (Wellbutrin)   Clonazepam (Klonopin)                              Hydroxyzine (Atarax)   Methylphenidate (Jornay PM)   Trazodone (Desyrel)   **Note: The testing scope of this panel does not include following   reported medications:   Cariprazine (Vraylar)   Hydrochlorothiazide (Hydrodiuril)   Vitamin D2 (Ergocalciferol)  ====================================================================  For clinical consultation, please call [phone].  ====================================================================        Comprehensive metabolic panel (04/21/2023 08:08)  CBC w AUTO Differential (04/21/2023 08:08)  TSH (04/21/2023 08:08)     US Gallbladder   4/18/2024  Increased echogenicity of the liver, suggestive of hepatic steatosis.  No evidence of cholelithiasis or acute cholecystitis.  No biliary ductal dilatation.    Assessment / Plan      1. Epigastric pain  2. Nausea and vomiting, unspecified vomiting type  3. Gastroesophageal reflux disease,  unspecified whether esophagitis present  4. Marijuana use  She has been on high-dose PPI for the past 5 months and her symptoms have not improved.  No difficulty swallowing.  She does vape, smokes marijuana every weekend or every other weekend.  Basic labs unremarkable. TSH normal. Gallbladder ultrasound dated 4/18/2024 with fatty liver, otherwise unremarkable.    Antireflux measures  Advised to avoid vaping/smoking/marijuana/THC use.  Continue omeprazole 40 mg daily.  Zofran as needed for nausea.  Advised to eat a softer diet with 4-5 very small meals throughout the day.  Avoid large meals.  EGD for further evaluation as symptoms have not improved with high-dose PPI.    - Case Request    5. Fatty (change of) liver, not elsewhere classified  6. Class 2 obesity due to excess calories without serious comorbidity with body mass index (BMI) of 36.0 to 36.9 in adult  BMI 36.58  No history of elevated liver enzymes.  She does have a history of drug abuse in the past. Hepatitis C antibody negative. Gallbladder US 4/18/2024 with fatty liver.  Advised low fat diet, exercise and weight reduction.     Patient Instructions   Antireflux measures: Avoid fried, fatty foods, alcohol, chocolate, coffee, tea,  soft drinks, all carbonated beverages (including sparkling water), peppermint and spearmint, spicy foods, tomatoes and tomato based foods, onions, peppers, and garlic.   Other antireflux measures include weight reduction if overweight, avoiding tight clothing around the abdomen, elevating the head of the bed 6 inches with blocks under the head board, and don't drink or eat before going to bed and avoid lying down immediately after meals.  Avoid vaping/smoking/marijuana/THC.  Omeprazole 40 mg 1 by mouth in the am 30 minutes before breakfast.  Zofran 4 mg 1 po every 8 hours as needed for nausea.  The patient should eat 4-5 very small meals throughout the day. Avoid large meals.  It is recommended to eat a softer diet. Meats are  best consumed ground. Fruits and vegetables are best consumed cooked or steamed and then mashed.   Low fiber, low fat diet with liberal water intake. May use soluble fiber.  Metamucil 1 packet/scoop daily or fiber gummies/capsules 2-4 per day.  Advised to exercise 30 minutes 4-5 days per week.   Advised to lose 20 pounds in the next 6-12 months.  Upper endoscopy-EGD: The indications, technique, alternatives and potential risk and complications were discussed with the patient including but not limited to bleeding, perforations, missing lesions and anesthetic complications. The patient understands and wishes to proceed with the procedure and has given their verbal consent. Written patient education information was given to the patient.   The patient will call if they have further questions before procedure.      Gilda Vallejo, APRN  8/13/2024    Please note that portions of this note may have been completed with a voice recognition program.

## 2024-08-13 NOTE — PATIENT INSTRUCTIONS
Antireflux measures: Avoid fried, fatty foods, alcohol, chocolate, coffee, tea,  soft drinks, all carbonated beverages (including sparkling water), peppermint and spearmint, spicy foods, tomatoes and tomato based foods, onions, peppers, and garlic.   Other antireflux measures include weight reduction if overweight, avoiding tight clothing around the abdomen, elevating the head of the bed 6 inches with blocks under the head board, and don't drink or eat before going to bed and avoid lying down immediately after meals.  Avoid vaping/smoking/marijuana/THC.  Omeprazole 40 mg 1 by mouth in the am 30 minutes before breakfast.  Zofran 4 mg 1 po every 8 hours as needed for nausea.  The patient should eat 4-5 very small meals throughout the day. Avoid large meals.  It is recommended to eat a softer diet. Meats are best consumed ground. Fruits and vegetables are best consumed cooked or steamed and then mashed.   Low fiber, low fat diet with liberal water intake. May use soluble fiber.  Metamucil 1 packet/scoop daily or fiber gummies/capsules 2-4 per day.  Advised to exercise 30 minutes 4-5 days per week.   Advised to lose 20 pounds in the next 6-12 months.  Upper endoscopy-EGD: The indications, technique, alternatives and potential risk and complications were discussed with the patient including but not limited to bleeding, perforations, missing lesions and anesthetic complications. The patient understands and wishes to proceed with the procedure and has given their verbal consent. Written patient education information was given to the patient.   The patient will call if they have further questions before procedure.

## 2024-08-30 NOTE — PRE-PROCEDURE INSTRUCTIONS
PAT phone history completed with patient for upcoming procedure on 9/4/24 with Dr. Carranza.    PAT PASS reviewed with patient and they verbalize understanding of the following:     Do not eat or drink anything after midnight the night before procedure unless otherwise instructed by physician/surgeon's office, this includes no gum, candy, mints, tobacco products or e-cigarettes.  Do not shave the area to be operated on at least 48 hours prior to procedure.  Do not wear makeup, lotion, hair products, or nail polish.  Do not wear any jewelry and remove all piercings.  Do not wear any adhesive if you wear dentures.  Do not wear contacts; bring in glasses if needed.  Only take medications on the morning of procedure as instructed by PAT nurse per anesthesia guidelines or as instructed by physician's office.  If you are on any blood thinners reach out to the physician/surgeon's office for instructions on when/if they will need to be stopped prior to procedure.  Bring in picture ID and insurance card, advanced directive copies if applicable, CPAP/BIPAP/Inhalers if indicated morning of procedure, leave any other valuables at home.  Ensure you have arranged for someone to drive you home the day of your procedure and someone to care for you at home afterwards. It is recommended that you do not drive, drink alcohol, or make any major legal decisions for at least 24 hours after your procedure is complete.    Instructions given on hospital entrance and registration location.

## 2024-09-04 ENCOUNTER — HOSPITAL ENCOUNTER (OUTPATIENT)
Facility: HOSPITAL | Age: 39
Setting detail: HOSPITAL OUTPATIENT SURGERY
Discharge: HOME | End: 2024-09-04
Attending: INTERNAL MEDICINE | Admitting: INTERNAL MEDICINE
Payer: COMMERCIAL

## 2024-09-04 ENCOUNTER — ANESTHESIA EVENT (OUTPATIENT)
Dept: GASTROENTEROLOGY | Facility: HOSPITAL | Age: 39
End: 2024-09-04
Payer: COMMERCIAL

## 2024-09-04 ENCOUNTER — ANESTHESIA (OUTPATIENT)
Dept: GASTROENTEROLOGY | Facility: HOSPITAL | Age: 39
End: 2024-09-04
Payer: COMMERCIAL

## 2024-09-04 VITALS
DIASTOLIC BLOOD PRESSURE: 68 MMHG | HEART RATE: 58 BPM | TEMPERATURE: 97.8 F | SYSTOLIC BLOOD PRESSURE: 101 MMHG | RESPIRATION RATE: 16 BRPM | OXYGEN SATURATION: 100 %

## 2024-09-04 DIAGNOSIS — R11.2 NAUSEA AND VOMITING, UNSPECIFIED VOMITING TYPE: ICD-10-CM

## 2024-09-04 DIAGNOSIS — R10.13 EPIGASTRIC PAIN: ICD-10-CM

## 2024-09-04 LAB
B-HCG UR QL: NEGATIVE
EXPIRATION DATE: NORMAL
INTERNAL NEGATIVE CONTROL: NORMAL
INTERNAL POSITIVE CONTROL: NORMAL
Lab: NORMAL

## 2024-09-04 PROCEDURE — 43239 EGD BIOPSY SINGLE/MULTIPLE: CPT | Performed by: INTERNAL MEDICINE

## 2024-09-04 PROCEDURE — 81025 URINE PREGNANCY TEST: CPT | Performed by: NURSE PRACTITIONER

## 2024-09-04 PROCEDURE — 25810000003 SODIUM CHLORIDE 0.9 % SOLUTION: Performed by: NURSE PRACTITIONER

## 2024-09-04 PROCEDURE — 25010000002 PROPOFOL 10 MG/ML EMULSION: Performed by: NURSE ANESTHETIST, CERTIFIED REGISTERED

## 2024-09-04 RX ORDER — SODIUM CHLORIDE 9 MG/ML
70 INJECTION, SOLUTION INTRAVENOUS CONTINUOUS PRN
Status: DISCONTINUED | OUTPATIENT
Start: 2024-09-04 | End: 2024-09-04 | Stop reason: HOSPADM

## 2024-09-04 RX ORDER — ONDANSETRON 2 MG/ML
4 INJECTION INTRAMUSCULAR; INTRAVENOUS ONCE AS NEEDED
Status: CANCELLED | OUTPATIENT
Start: 2024-09-04

## 2024-09-04 RX ORDER — OMEPRAZOLE 40 MG/1
40 CAPSULE, DELAYED RELEASE ORAL DAILY
Qty: 90 CAPSULE | Refills: 1 | Status: SHIPPED | OUTPATIENT
Start: 2024-09-04

## 2024-09-04 RX ORDER — PROPOFOL 10 MG/ML
VIAL (ML) INTRAVENOUS AS NEEDED
Status: DISCONTINUED | OUTPATIENT
Start: 2024-09-04 | End: 2024-09-04 | Stop reason: SURG

## 2024-09-04 RX ADMIN — PROPOFOL 200 MG: 10 INJECTION, EMULSION INTRAVENOUS at 10:43

## 2024-09-04 RX ADMIN — LIDOCAINE HYDROCHLORIDE 100 MG: 20 INJECTION, SOLUTION INTRAVENOUS at 10:43

## 2024-09-04 RX ADMIN — SODIUM CHLORIDE 70 ML/HR: 9 INJECTION, SOLUTION INTRAVENOUS at 10:08

## 2024-09-04 NOTE — H&P
[facility]  HISTORY AND PHYSICAL    Patient Name: Sarita Pace  : 1985  MRN: [id number]    Chief Complaint:   For EGD    History Of Presenting Illness:    Epigastric pain   Nausea vomiting  GERD    Past Medical History:   Diagnosis Date    Anxiety     Bipolar 1 disorder     Full dentures     GERD (gastroesophageal reflux disease)     Hypothyroidism     Migraine     Shingles        Past Surgical History:   Procedure Laterality Date     SECTION      TONSILLECTOMY N/A     WISDOM TOOTH EXTRACTION N/A        Social History     Socioeconomic History    Marital status:    Tobacco Use    Smoking status: Former     Types: Cigarettes    Smokeless tobacco: Never   Vaping Use    Vaping status: Every Day   Substance and Sexual Activity    Alcohol use: Yes     Comment: monthly maybe    Drug use: Yes     Types: Methamphetamines, Benzodiazepines, Heroin, Hydrocodone, Cocaine(coke), Marijuana     Comment: history of  IV drug use and snorting - currently uses marijuana    Sexual activity: Defer       Family History   Problem Relation Age of Onset    Diabetes Mother     Diabetes Father     Colon cancer Neg Hx        Prior to Admission Medications:  Medications Prior to Admission   Medication Sig Dispense Refill Last Dose    omeprazole (priLOSEC) 40 MG capsule Take 1 capsule by mouth Daily. 90 capsule 0 Past Week    ondansetron ODT (ZOFRAN-ODT) 4 MG disintegrating tablet Place 1 tablet on the tongue Every 8 (Eight) Hours As Needed for Nausea or Vomiting. 30 tablet 0 Past Week       Allergies:  No Known Allergies     Vitals: Temp:  [97.8 °F (36.6 °C)] 97.8 °F (36.6 °C)  Heart Rate:  [66] 66  Resp:  [16] 16  BP: (117)/(65) 117/65    Review Of Systems:  Constitutional:  Negative for chills, fever, and unexpected weight change.  Respiratory:  Negative for cough, chest tightness, shortness of breath, and wheezing.  Cardiovascular:  Negative for chest pain, palpitations, and leg  swelling.  Gastrointestinal:  Negative for abdominal distention, abdominal pain, nausea, vomiting.  Neurological:  Negative for weakness, numbness, and headaches.     Physical Exam:    General Appearance:  Alert, cooperative, in no acute distress.   Lungs:   Clear to auscultation, respirations regular, even and                 unlabored.   Heart:  Regular rhythm and normal rate.   Abdomen:   Normal bowel sounds, no masses, no organomegaly. Soft, nontender, nondistended   Neurologic: Alert and oriented x 3. Moves all four limbs equally       Assessment & Plan     Assessment:  Active Problems:    Epigastric pain    Nausea and vomiting      Plan: Esophagogastroduodenoscopy with possible biopsy, polypectomy, dilatation, endoscopic band ligation, ablation of arteriovenous malformations or control of bleeding (N/A)     Angelo Carranza MD  9/4/2024

## 2024-09-04 NOTE — DISCHARGE INSTRUCTIONS
- Discharge patient to home (ambulatory).   - Resume previous diet.   - lifestyle changes  - Avoid smoking, vaping  - Follow an antireflux regimen.   - PPI daily  - Await pathology results.   - Return to my office in 8 weeks.     Rest today  No pushing,pulling,tugging,heavy lifting, or strenuous activity   No major decision making,driving,or drinking alcoholic beverages for 24 hours due to the sedation you received  Always use good hand hygiene/washing technique  No driving on pain medication.  To assist you in voiding:  Drink plenty of fluids  Listen to running water while attempting to void.    If you are unable to urinate and you have an uncomfortable urge to void or it has been   6 hours since you were discharged, return to the Emergency Room

## 2024-09-04 NOTE — ANESTHESIA POSTPROCEDURE EVALUATION
Patient: Sarita Pace    Procedure Summary       Date: 09/04/24 Room / Location: [facility] ENDOSCOPY 2 / [facility] ENDOSCOPY    Anesthesia Start: 1038 Anesthesia Stop: 1051    Procedure: Esophagogastroduodenoscopy with biopsy (Esophagus) Diagnosis:       Epigastric pain      Nausea and vomiting, unspecified vomiting type      (Epigastric pain [R10.13])      (Nausea and vomiting, unspecified vomiting type [R11.2])    Surgeons: Angelo Carranza MD Provider: Gus Spaulding CRNA    Anesthesia Type: MAC ASA Status: 2            Anesthesia Type: MAC    Vitals  Vitals Value Taken Time   /68 09/04/24 1112   Temp 97.8 °F (36.6 °C) 09/04/24 1052   Pulse 58 09/04/24 1112   Resp 16 09/04/24 1112   SpO2 100 % 09/04/24 1112           Post Anesthesia Care and Evaluation    Patient location during evaluation: PHASE II  Patient participation: complete - patient participated  Level of consciousness: awake  Pain score: 1  Pain management: adequate    Airway patency: patent  Anesthetic complications: No anesthetic complications  PONV Status: controlled  Cardiovascular status: acceptable and stable  Respiratory status: acceptable  Hydration status: acceptable    Comments: See Nursing record for post procedural Vital Signs as per protocol.

## 2024-09-04 NOTE — ANESTHESIA PREPROCEDURE EVALUATION
Anesthesia Evaluation     Patient summary reviewed and Nursing notes reviewed   no history of anesthetic complications:   NPO Solid Status: > 8 hours  NPO Liquid Status: > 8 hours           Airway   Mallampati: II  TM distance: >3 FB  Neck ROM: full  no difficulty expected and Possible difficult intubation  Dental - normal exam     Pulmonary - negative pulmonary ROS and normal exam   Cardiovascular - negative cardio ROS and normal exam        Neuro/Psych  (+) headaches, psychiatric history Bipolar, Anxiety and Depression  GI/Hepatic/Renal/Endo    (+) obesity, morbid obesity, GERD, liver disease fatty liver disease, thyroid problem hypothyroidism    Musculoskeletal (-) negative ROS    Abdominal    Substance History   (+) drug use     OB/GYN negative ob/gyn ROS         Other - negative ROS       ROS/Med Hx Other: Reg MJ use.              Anesthesia Plan    ASA 2     MAC     (Pt told that intravenous sedation will be used as the primary anesthetic along with local anesthesia if necessary. Every effort will be made to make sure the patient is comfortable.     The patient was told they may or may not have recall for the procedure. It was further explained that if the MAC was not adequate that a general anesthetic with either an LMA or endotracheal tube would be required.     Will proceed with the plan of care.)  intravenous induction     Anesthetic plan, risks, benefits, and alternatives have been provided, discussed and informed consent has been obtained with: patient.    CODE STATUS:

## 2024-09-05 LAB — REF LAB TEST METHOD: NORMAL

## 2024-12-10 ENCOUNTER — APPOINTMENT (OUTPATIENT)
Dept: ULTRASOUND IMAGING | Facility: HOSPITAL | Age: 39
End: 2024-12-10
Payer: COMMERCIAL

## 2024-12-10 ENCOUNTER — HOSPITAL ENCOUNTER (EMERGENCY)
Facility: HOSPITAL | Age: 39
Discharge: HOME | End: 2024-12-10
Attending: STUDENT IN AN ORGANIZED HEALTH CARE EDUCATION/TRAINING PROGRAM | Admitting: STUDENT IN AN ORGANIZED HEALTH CARE EDUCATION/TRAINING PROGRAM
Payer: COMMERCIAL

## 2024-12-10 VITALS
BODY MASS INDEX: 36.8 KG/M2 | RESPIRATION RATE: 18 BRPM | WEIGHT: 200 LBS | SYSTOLIC BLOOD PRESSURE: 105 MMHG | TEMPERATURE: 98 F | DIASTOLIC BLOOD PRESSURE: 68 MMHG | HEIGHT: 62 IN | HEART RATE: 84 BPM | OXYGEN SATURATION: 98 %

## 2024-12-10 DIAGNOSIS — R10.11 RIGHT UPPER QUADRANT ABDOMINAL PAIN: Primary | ICD-10-CM

## 2024-12-10 LAB
ALBUMIN SERPL-MCNC: 4.2 G/DL (ref 3.5–5.2)
ALBUMIN/GLOB SERPL: 1.2 G/DL
ALP SERPL-CCNC: 100 U/L (ref 39–117)
ALT SERPL W P-5'-P-CCNC: 17 U/L (ref 1–33)
ANION GAP SERPL CALCULATED.3IONS-SCNC: 10.7 MMOL/L (ref 5–15)
AST SERPL-CCNC: 17 U/L (ref 1–32)
BASOPHILS # BLD AUTO: 0.04 10*3/MM3 (ref 0–0.2)
BASOPHILS NFR BLD AUTO: 0.4 % (ref 0–1.5)
BILIRUB SERPL-MCNC: 0.4 MG/DL (ref 0–1.2)
BUN SERPL-MCNC: 15 MG/DL (ref 6–20)
BUN/CREAT SERPL: 20 (ref 7–25)
CALCIUM SPEC-SCNC: 8.8 MG/DL (ref 8.6–10.5)
CHLORIDE SERPL-SCNC: 101 MMOL/L (ref 98–107)
CO2 SERPL-SCNC: 24.3 MMOL/L (ref 22–29)
CREAT SERPL-MCNC: 0.75 MG/DL (ref 0.57–1)
DEPRECATED RDW RBC AUTO: 36.9 FL (ref 37–54)
EGFRCR SERPLBLD CKD-EPI 2021: 104 ML/MIN/1.73
EOSINOPHIL # BLD AUTO: 0.04 10*3/MM3 (ref 0–0.4)
EOSINOPHIL NFR BLD AUTO: 0.4 % (ref 0.3–6.2)
ERYTHROCYTE [DISTWIDTH] IN BLOOD BY AUTOMATED COUNT: 12.3 % (ref 12.3–15.4)
GLOBULIN UR ELPH-MCNC: 3.5 GM/DL
GLUCOSE SERPL-MCNC: 106 MG/DL (ref 65–99)
HCT VFR BLD AUTO: 39.7 % (ref 34–46.6)
HGB BLD-MCNC: 13.1 G/DL (ref 12–15.9)
HOLD SPECIMEN: NORMAL
HOLD SPECIMEN: NORMAL
IMM GRANULOCYTES # BLD AUTO: 0.04 10*3/MM3 (ref 0–0.05)
IMM GRANULOCYTES NFR BLD AUTO: 0.4 % (ref 0–0.5)
LIPASE SERPL-CCNC: 16 U/L (ref 13–60)
LYMPHOCYTES # BLD AUTO: 0.84 10*3/MM3 (ref 0.7–3.1)
LYMPHOCYTES NFR BLD AUTO: 8.4 % (ref 19.6–45.3)
MCH RBC QN AUTO: 27.1 PG (ref 26.6–33)
MCHC RBC AUTO-ENTMCNC: 33 G/DL (ref 31.5–35.7)
MCV RBC AUTO: 82.2 FL (ref 79–97)
MONOCYTES # BLD AUTO: 0.47 10*3/MM3 (ref 0.1–0.9)
MONOCYTES NFR BLD AUTO: 4.7 % (ref 5–12)
NEUTROPHILS NFR BLD AUTO: 8.52 10*3/MM3 (ref 1.7–7)
NEUTROPHILS NFR BLD AUTO: 85.7 % (ref 42.7–76)
NRBC BLD AUTO-RTO: 0 /100 WBC (ref 0–0.2)
PLATELET # BLD AUTO: 296 10*3/MM3 (ref 140–450)
PMV BLD AUTO: 9.5 FL (ref 6–12)
POTASSIUM SERPL-SCNC: 4 MMOL/L (ref 3.5–5.2)
PROT SERPL-MCNC: 7.7 G/DL (ref 6–8.5)
RBC # BLD AUTO: 4.83 10*6/MM3 (ref 3.77–5.28)
SODIUM SERPL-SCNC: 136 MMOL/L (ref 136–145)
TROPONIN T SERPL HS-MCNC: <6 NG/L
WBC NRBC COR # BLD AUTO: 9.95 10*3/MM3 (ref 3.4–10.8)
WHOLE BLOOD HOLD COAG: NORMAL
WHOLE BLOOD HOLD SPECIMEN: NORMAL

## 2024-12-10 PROCEDURE — 96374 THER/PROPH/DIAG INJ IV PUSH: CPT

## 2024-12-10 PROCEDURE — 83690 ASSAY OF LIPASE: CPT

## 2024-12-10 PROCEDURE — 25810000003 SODIUM CHLORIDE 0.9 % SOLUTION: Performed by: STUDENT IN AN ORGANIZED HEALTH CARE EDUCATION/TRAINING PROGRAM

## 2024-12-10 PROCEDURE — 93005 ELECTROCARDIOGRAM TRACING: CPT | Performed by: STUDENT IN AN ORGANIZED HEALTH CARE EDUCATION/TRAINING PROGRAM

## 2024-12-10 PROCEDURE — 25010000002 ONDANSETRON PER 1 MG: Performed by: STUDENT IN AN ORGANIZED HEALTH CARE EDUCATION/TRAINING PROGRAM

## 2024-12-10 PROCEDURE — 84484 ASSAY OF TROPONIN QUANT: CPT

## 2024-12-10 PROCEDURE — 25010000002 KETOROLAC TROMETHAMINE PER 15 MG: Performed by: STUDENT IN AN ORGANIZED HEALTH CARE EDUCATION/TRAINING PROGRAM

## 2024-12-10 PROCEDURE — 96361 HYDRATE IV INFUSION ADD-ON: CPT

## 2024-12-10 PROCEDURE — 85025 COMPLETE CBC W/AUTO DIFF WBC: CPT

## 2024-12-10 PROCEDURE — 96375 TX/PRO/DX INJ NEW DRUG ADDON: CPT

## 2024-12-10 PROCEDURE — 93005 ELECTROCARDIOGRAM TRACING: CPT

## 2024-12-10 PROCEDURE — 80053 COMPREHEN METABOLIC PANEL: CPT

## 2024-12-10 PROCEDURE — 99284 EMERGENCY DEPT VISIT MOD MDM: CPT | Performed by: STUDENT IN AN ORGANIZED HEALTH CARE EDUCATION/TRAINING PROGRAM

## 2024-12-10 PROCEDURE — 76705 ECHO EXAM OF ABDOMEN: CPT

## 2024-12-10 RX ORDER — SUCRALFATE 1 G/1
1 TABLET ORAL 4 TIMES DAILY
Qty: 20 TABLET | Refills: 0 | Status: SHIPPED | OUTPATIENT
Start: 2024-12-10

## 2024-12-10 RX ORDER — KETOROLAC TROMETHAMINE 30 MG/ML
15 INJECTION, SOLUTION INTRAMUSCULAR; INTRAVENOUS ONCE
Status: COMPLETED | OUTPATIENT
Start: 2024-12-10 | End: 2024-12-10

## 2024-12-10 RX ORDER — SODIUM CHLORIDE 0.9 % (FLUSH) 0.9 %
10 SYRINGE (ML) INJECTION AS NEEDED
Status: DISCONTINUED | OUTPATIENT
Start: 2024-12-10 | End: 2024-12-11 | Stop reason: HOSPADM

## 2024-12-10 RX ORDER — ONDANSETRON 2 MG/ML
4 INJECTION INTRAMUSCULAR; INTRAVENOUS ONCE
Status: COMPLETED | OUTPATIENT
Start: 2024-12-10 | End: 2024-12-10

## 2024-12-10 RX ADMIN — SODIUM CHLORIDE 1000 ML: 9 INJECTION, SOLUTION INTRAVENOUS at 18:50

## 2024-12-10 RX ADMIN — ONDANSETRON 4 MG: 2 INJECTION INTRAMUSCULAR; INTRAVENOUS at 18:49

## 2024-12-10 RX ADMIN — KETOROLAC TROMETHAMINE 15 MG: 30 INJECTION, SOLUTION INTRAMUSCULAR; INTRAVENOUS at 18:50

## 2024-12-10 NOTE — Clinical Note
[facility] EMERGENCY DEPARTMENT  [address] [address]  Phone: [phone]    Sarita Pace was seen and treated in our emergency department on 12/10/2024.  She may return to work on 12/12/2024.         Thank you for choosing [facility].    Gordon Sierra,

## 2024-12-10 NOTE — ED PROVIDER NOTES
[facility]  Emergency Department Encounter  Emergency Medicine Physician Note       Pt Name: Sarita Pace  MRN: [id number]  Pt :   1985  Room Number:    Date of encounter:  12/10/2024  PCP: Ayesha Mathew APRN  ED Physician: Gordon Sierra DO    HPI:  Sarita Pace is a 39 y.o. female who presents to the ED with chief complaint of ABDOMINAL PAIN.    Onset: This morning around 6 AM  Timing: Gradual  Location: Right upper quadrant  Description: Sharp  Duration: Constant  Modifying factors: Denies  Severity: Mild to moderate    Associated symptoms: Nausea, vomiting x 1, decreased appetite.    Denies fever, chills, chest pain or shortness of breath, flank pain, problems with urination or bowel movements.    Pertinent surgical history:  section    Vapes daily.  Occasional marijuana and EtOH use.  Works at a local [employer].    PAST MEDICAL HISTORY  Past Medical History:   Diagnosis Date    Anxiety     Bipolar 1 disorder     Full dentures     GERD (gastroesophageal reflux disease)     Hypothyroidism     Migraine     Shingles      Current Outpatient Medications   Medication Instructions    omeprazole (PRILOSEC) 40 mg, Oral, Daily    ondansetron ODT (ZOFRAN-ODT) 4 mg, Translingual, Every 8 Hours PRN    sucralfate (CARAFATE) 1 g, Oral, 4 Times Daily      PAST SURGICAL HISTORY  Past Surgical History:   Procedure Laterality Date     SECTION      ENDOSCOPY N/A 2024    Procedure: Esophagogastroduodenoscopy with biopsy;  Surgeon: Angelo Carranza MD;  Location: [facility] ENDOSCOPY;  Service: Gastroenterology;  Laterality: N/A;    TONSILLECTOMY N/A     WISDOM TOOTH EXTRACTION N/A        FAMILY HISTORY  Family History   Problem Relation Age of Onset    Diabetes Mother     Diabetes Father     Colon cancer Neg Hx        SOCIAL HISTORY  Social History     Socioeconomic History    Marital status:    Tobacco Use    Smoking status: Former      Types: Cigarettes    Smokeless tobacco: Never   Vaping Use    Vaping status: Every Day   Substance and Sexual Activity    Alcohol use: Yes     Comment: monthly maybe    Drug use: Yes     Types: Methamphetamines, Benzodiazepines, Heroin, Hydrocodone, Cocaine(coke), Marijuana     Comment: history of  IV drug use and snorting - currently uses marijuana    Sexual activity: Defer     ALLERGIES  Patient has no known allergies.    REVIEW OF SYSTEMS  All systems reviewed and negative except for those discussed in HPI.     PHYSICAL EXAM  ED Triage Vitals [12/10/24 1705]   Temp Heart Rate Resp BP SpO2   98 °F (36.7 °C) 103 18 135/97 97 %      Temp src Heart Rate Source Patient Position BP Location FiO2 (%)   Oral Monitor Sitting Left arm --     I have reviewed the triage vital signs and nursing notes.    General: Alert.  Nontoxic appearance.  No acute distress.  Head: Normocephalic.  Atraumatic.  Eyes: No scleral icterus.  ENT: Moist mucous membranes.  Cardiovascular: Regular rate and rhythm.  No murmurs.  No rubs.  2+ distal pulses bilaterally.  Respiratory: Equal breath sounds bilaterally. No wheezing. No rales.  No rhonchi.  GI: Abdomen is soft.  Nondistended.  + Tenderness right upper quadrant.  No rebound.  No guarding.  No CVA tenderness.  Negative Aguero sign.  MSK: Moves all 4 extremities.  Neurologic: Oriented x 3.  No focal deficits.  Skin: No edema. No erythema. No pallor. No cyanosis.  Psych: Normal mood and affect.    LAB RESULTS  Recent Results (from the past 24 hours)   Comprehensive Metabolic Panel    Collection Time: 12/10/24  5:16 PM    Specimen: Blood   Result Value Ref Range    Glucose 106 (H) 65 - 99 mg/dL    BUN 15 6 - 20 mg/dL    Creatinine 0.75 0.57 - 1.00 mg/dL    Sodium 136 136 - 145 mmol/L    Potassium 4.0 3.5 - 5.2 mmol/L    Chloride 101 98 - 107 mmol/L    CO2 24.3 22.0 - 29.0 mmol/L    Calcium 8.8 8.6 - 10.5 mg/dL    Total Protein 7.7 6.0 - 8.5 g/dL    Albumin 4.2 3.5 - 5.2 g/dL    ALT (SGPT) 17 1  - 33 U/L    AST (SGOT) 17 1 - 32 U/L    Alkaline Phosphatase 100 39 - 117 U/L    Total Bilirubin 0.4 0.0 - 1.2 mg/dL    Globulin 3.5 gm/dL    A/G Ratio 1.2 g/dL    BUN/Creatinine Ratio 20.0 7.0 - 25.0    Anion Gap 10.7 5.0 - 15.0 mmol/L    eGFR 104.0 >60.0 mL/min/1.73   Lipase    Collection Time: 12/10/24  5:16 PM    Specimen: Blood   Result Value Ref Range    Lipase 16 13 - 60 U/L   High Sensitivity Troponin T    Collection Time: 12/10/24  5:16 PM    Specimen: Blood   Result Value Ref Range    HS Troponin T <6 <14 ng/L   Green Top (Gel)    Collection Time: 12/10/24  5:16 PM   Result Value Ref Range    Extra Tube Hold for add-ons.    Lavender Top    Collection Time: 12/10/24  5:16 PM   Result Value Ref Range    Extra Tube hold for add-on    Gold Top - SST    Collection Time: 12/10/24  5:16 PM   Result Value Ref Range    Extra Tube Hold for add-ons.    Light Blue Top    Collection Time: 12/10/24  5:16 PM   Result Value Ref Range    Extra Tube Hold for add-ons.    CBC Auto Differential    Collection Time: 12/10/24  5:16 PM    Specimen: Blood   Result Value Ref Range    WBC 9.95 3.40 - 10.80 10*3/mm3    RBC 4.83 3.77 - 5.28 10*6/mm3    Hemoglobin 13.1 12.0 - 15.9 g/dL    Hematocrit 39.7 34.0 - 46.6 %    MCV 82.2 79.0 - 97.0 fL    MCH 27.1 26.6 - 33.0 pg    MCHC 33.0 31.5 - 35.7 g/dL    RDW 12.3 12.3 - 15.4 %    RDW-SD 36.9 (L) 37.0 - 54.0 fl    MPV 9.5 6.0 - 12.0 fL    Platelets 296 140 - 450 10*3/mm3    Neutrophil % 85.7 (H) 42.7 - 76.0 %    Lymphocyte % 8.4 (L) 19.6 - 45.3 %    Monocyte % 4.7 (L) 5.0 - 12.0 %    Eosinophil % 0.4 0.3 - 6.2 %    Basophil % 0.4 0.0 - 1.5 %    Immature Grans % 0.4 0.0 - 0.5 %    Neutrophils, Absolute 8.52 (H) 1.70 - 7.00 10*3/mm3    Lymphocytes, Absolute 0.84 0.70 - 3.10 10*3/mm3    Monocytes, Absolute 0.47 0.10 - 0.90 10*3/mm3    Eosinophils, Absolute 0.04 0.00 - 0.40 10*3/mm3    Basophils, Absolute 0.04 0.00 - 0.20 10*3/mm3    Immature Grans, Absolute 0.04 0.00 - 0.05 10*3/mm3    nRBC  0.0 0.0 - 0.2 /100 WBC       RADIOLOGY  US Gallbladder    Result Date: 12/10/2024  FINAL REPORT TECHNIQUE: null CLINICAL HISTORY: RUQ pain COMPARISON: null FINDINGS: US abdomen limited Comparison: None Findings: Pancreatic tail is obscured by bowel gas. Remaining pancreas appears unremarkable. The liver is mildly echogenic, heterogeneous, and attenuating. There is no intrahepatic bile duct dilatation. The common duct is 4 mm in diameter. The gallbladder is normal. There is no sonographic Aguero sign. The main portal vein is antegrade. The right kidney is 12.8 cm in length. No ascites.     IMPRESSION: 1. Suspect fatty liver. 2. Normal gallbladder. Authenticated and Electronically Signed by Norma Barry MD on 12/10/2024 09:46:27 PM     PROCEDURES  Procedures    RISK STRATIFICATION    MEDICAL DECISION MAKING  39 y.o. female with past medical history listed above who presents with right upper quadrant abdominal pain, nausea and vomiting.    Vital signs within normal limits.    Based on clinical presentation and physical exam, differential diagnosis includes, but is not limited to, symptomatic cholelithiasis, biliary colic, cholecystitis, gastritis, peptic ulcer disease, pancreatitis.    Patient notes reviewed.  EGD report from 9/4/2024 reviewed.  Findings showed esophagitis and erosive gastropathy.  Patient was started on PPI.    At least 3 different tests have been ordered on this patient.    Please see ED course below for my interpretation of the ED workup.  ED Course as of 12/10/24 2203   Tue Dec 10, 2024   1722 EKG per my interpretation normal sinus rhythm, rate 94, normal axis, no ST segment elevation or depression, normal T waves, normal QRS QTC intervals.   [JS]   1856 I reviewed the labs listed above. Clinically unremarkable.    Notable findings are highlighted below.    Old laboratory data was reviewed from the medical records and compared to today's results.   [JS]   1856 CBC & Differential(!) [JS]    1856 WBC: 9.95 [JS]   1856 Hemoglobin: 13.1 [JS]   1856 Platelets: 296 [JS]   1856 Comprehensive Metabolic Panel(!) [JS]   1856 Glucose(!): 106 [JS]   1856 Lipase: 16 [JS]   1856 HS Troponin T: <6 [JS]   2150 US Gallbladder  Per radiology report: 1. Suspect fatty liver. 2. Normal gallbladder.  [JS]      ED Course User Index  [JS] Gordon Sierra DO     Medications administered in ED:  Medications   sodium chloride 0.9 % flush 10 mL (has no administration in time range)   sodium chloride 0.9 % bolus 1,000 mL (0 mL Intravenous Stopped 12/10/24 2042)   ondansetron (ZOFRAN) injection 4 mg (4 mg Intravenous Given 12/10/24 1849)   ketorolac (TORADOL) injection 15 mg (15 mg Intravenous Given 12/10/24 1850)     On re-evaluation, patient resting comfortably.  States symptoms have improved following therapy. Repeat abdominal exam performed.  Abdomen remains soft, nondistended, nonsurgical in nature. Vital signs remained stable on room air.  Patient was ambulatory in the ED with steady gait.  Able to tolerate oral intake appropriately.    I discussed the findings of the ED workup with the patient at bedside. Patient was also informed of incidental findings on imaging listed above. Shared decision making discussed with the patient in regards to CT imaging. Patient was informed of the indication, benefits, alternative diagnostic options, and risks including, but not limited to missed and/or delayed diagnosis, therefore leading to failure to treat. After discussion patient politely declined CT imaging. The patient is clinically not intoxicated, free from distracting pain, appears to have intact insight, judgment and reason and in my medical opinion has the capacity to make medical decisions. No clinical indication for admission.  I recommended outpatient follow-up with PCP/gastroenterology. Patient already has follow-up scheduled in 1 week. Patient was deemed medically stable for discharge with close outpatient follow-up and  strict ED return precautions.    Home medications were reviewed.  Prescriptions for discharge: Carafate    Chronic conditions affecting care: None    Social determinants of health impacting treatment or disposition: None    REPEAT VITAL SIGNS  AS OF 22:03 EST VITALS:  BP - 105/68  HR - 84  TEMP - 98 °F (36.7 °C) (Oral)  O2 SATS - 98%    DIAGNOSIS  Final diagnoses:   Right upper quadrant abdominal pain     DISPOSITION  ED Disposition       ED Disposition   Discharge    Condition   Stable    Comment   --               PATIENT REFERRALS  Ayesha Mathew, APRN  [address]  [place] [id number] [phone]      PCP.  48 hours.    Angelo Carranza MD  [address]  [facility] [address] [phone]      Gastroenterology.  Previously scheduled appointment            Please note that portions of this document were completed with voice recognition software.        Gordon Sierra DO  12/12/24 0208

## 2024-12-11 NOTE — DISCHARGE INSTRUCTIONS
Instructions from Dr. Sierra:    It was a privilege being your ER physician today.    Please follow-up in clinic as we discussed.    Please return to the ER if you experience any worsening symptoms or for any other concerns.

## 2024-12-17 ENCOUNTER — OFFICE VISIT (OUTPATIENT)
Dept: GASTROENTEROLOGY | Facility: CLINIC | Age: 39
End: 2024-12-17
Payer: COMMERCIAL

## 2024-12-17 VITALS
OXYGEN SATURATION: 96 % | BODY MASS INDEX: 36.58 KG/M2 | DIASTOLIC BLOOD PRESSURE: 82 MMHG | HEART RATE: 84 BPM | SYSTOLIC BLOOD PRESSURE: 122 MMHG | WEIGHT: 200 LBS

## 2024-12-17 DIAGNOSIS — R10.13 EPIGASTRIC PAIN: Primary | Chronic | ICD-10-CM

## 2024-12-17 DIAGNOSIS — E66.09 CLASS 2 OBESITY DUE TO EXCESS CALORIES WITHOUT SERIOUS COMORBIDITY WITH BODY MASS INDEX (BMI) OF 36.0 TO 36.9 IN ADULT: ICD-10-CM

## 2024-12-17 DIAGNOSIS — F12.90 MARIJUANA USE: Chronic | ICD-10-CM

## 2024-12-17 DIAGNOSIS — K31.89 EROSIVE GASTROPATHY: Chronic | ICD-10-CM

## 2024-12-17 DIAGNOSIS — K76.0 FATTY (CHANGE OF) LIVER, NOT ELSEWHERE CLASSIFIED: Chronic | ICD-10-CM

## 2024-12-17 DIAGNOSIS — E66.812 CLASS 2 OBESITY DUE TO EXCESS CALORIES WITHOUT SERIOUS COMORBIDITY WITH BODY MASS INDEX (BMI) OF 36.0 TO 36.9 IN ADULT: ICD-10-CM

## 2024-12-17 DIAGNOSIS — R11.2 NAUSEA AND VOMITING, UNSPECIFIED VOMITING TYPE: Chronic | ICD-10-CM

## 2024-12-17 DIAGNOSIS — K21.00 GASTROESOPHAGEAL REFLUX DISEASE WITH ESOPHAGITIS WITHOUT HEMORRHAGE: Chronic | ICD-10-CM

## 2024-12-17 NOTE — PATIENT INSTRUCTIONS
Antireflux measures: Avoid fried, fatty foods, alcohol, chocolate, coffee, tea,  soft drinks, all carbonated beverages (including sparkling water), peppermint and spearmint, spicy foods, tomatoes and tomato based foods, onions, peppers, and garlic.   Other antireflux measures include weight reduction if overweight, avoiding tight clothing around the abdomen, elevating the head of the bed 6 inches with blocks under the head board, and don't drink or eat before going to bed and avoid lying down immediately after meals.  Avoid vaping/smoking/marijuana/THC.  Omeprazole 40 mg 1 by mouth in the am 30 minutes before breakfast.  Zofran 4 mg 1 po every 8 hours as needed for nausea.  Avoid all NSAIDs including ibuprofen, Motrin, Advil, Aleve, Naprosyn, etc.  May take Tylenol products if needed.  Advised patient to make appointment with PCP to discuss alternative treatments for migraines.   The patient should eat 4-5 very small meals throughout the day. Avoid large meals.  It is recommended to eat a softer diet. Meats are best consumed ground. Fruits and vegetables are best consumed cooked or steamed and then mashed.   Low fiber, low fat diet with liberal water intake. May use soluble fiber.  Metamucil 1 packet/scoop daily or fiber gummies/capsules 2-4 per day.  Advised to exercise 30 minutes 4-5 days per week.   Advised to lose 20 pounds in the next 6-12 months.  Labs  Follow up: 6 months

## 2024-12-17 NOTE — PROGRESS NOTES
Follow Up Note     Date: 2024   Patient Name: Sarita Pace  MRN: [id number]  : 1985     Primary Care Provider: Ayesha Mathew APRN     Chief Complaint   Patient presents with    Follow-up    Abdominal Pain     2024  History of Present Illness  The patient is a 39-year-old female here for a follow-up of abdominal pain.    She reports no complications following her endoscopy. However, she experienced an episode of severe abdominal pain on Tuesday, prompting a visit to the emergency room. Initially suspecting gallbladder issues, but had an ultrasound that was normal. She is feeling better today. She has been making efforts to quit vaping since Tuesday and has implemented dietary modifications at home. She has been prescribed Carafate, which she has been taking since last week. She also has a supply of Zofran at home. She has a history of migraines and frequently uses ibuprofen for management. She is taking her PPI once a day. Denies any GI bleeding.     Interval History:  2024  She has been having upper abdominal pain with nausea since 2024 that occurs several times per week. She constantly feels like she has overeaten. She has had a few episodes of vomiting. Denies any hematemesis or melena. She has been on Omeprazole 40 mg daily since 2024 but it hasn't helped. She has occasional reflux for several years but it hasn't helped. Denies any NSAID use. She does smoke marijuana almost every weekend or every other weekend.     She denies any constipation, diarrhea or GI bleeding. She has not had a colonoscopy or EGD in the past. No family history of GI malignancy.     Subjective      Past Medical History:   Diagnosis Date    Anxiety     Bipolar 1 disorder     Fatty liver     Full dentures     GERD (gastroesophageal reflux disease)     Hypothyroidism     Migraine     Shingles      Past Surgical History:   Procedure Laterality Date     SECTION       ENDOSCOPY N/A 9/4/2024    Procedure: Esophagogastroduodenoscopy with biopsy;  Surgeon: Angelo Carranza MD;  Location: [facility] ENDOSCOPY;  Service: Gastroenterology;  Laterality: N/A;    TONSILLECTOMY N/A 2001    WISDOM TOOTH EXTRACTION N/A 2001     Family History   Problem Relation Age of Onset    Diabetes Mother     Diabetes Father     Colon cancer Neg Hx      Social History     Socioeconomic History    Marital status:    Tobacco Use    Smoking status: Former     Types: Cigarettes    Smokeless tobacco: Never   Vaping Use    Vaping status: Every Day   Substance and Sexual Activity    Alcohol use: Yes     Comment: monthly maybe    Drug use: Yes     Types: Methamphetamines, Benzodiazepines, Heroin, Hydrocodone, Cocaine(coke), Marijuana     Comment: history of  IV drug use and snorting - currently uses marijuana    Sexual activity: Defer       Current Outpatient Medications:     omeprazole (priLOSEC) 40 MG capsule, Take 1 capsule by mouth Daily. Indications: Esophagus Inflammation with Erosion, Disp: 90 capsule, Rfl: 1    ondansetron ODT (ZOFRAN-ODT) 4 MG disintegrating tablet, Place 1 tablet on the tongue Every 8 (Eight) Hours As Needed for Nausea or Vomiting., Disp: 30 tablet, Rfl: 0    sucralfate (CARAFATE) 1 g tablet, Take 1 tablet by mouth 4 (Four) Times a Day., Disp: 20 tablet, Rfl: 0    No Known Allergies    The following portions of the patient's history were reviewed and updated as appropriate: allergies, current medications, past family history, past medical history, past social history, past surgical history and problem list.  Objective     Physical Exam  Vitals and nursing note reviewed.   Constitutional:       General: She is not in acute distress.     Appearance: Normal appearance. She is well-developed.   HENT:      Head: Normocephalic and atraumatic.      Mouth/Throat:      Mouth: Mucous membranes are not pale, not dry and not cyanotic.   Eyes:      General: Lids are normal.   Neck:       Trachea: Trachea normal.   Cardiovascular:      Rate and Rhythm: Normal rate.   Pulmonary:      Effort: Pulmonary effort is normal. No respiratory distress.      Breath sounds: Normal breath sounds.   Abdominal:      Tenderness: There is no abdominal tenderness.   Skin:     General: Skin is warm and dry.   Neurological:      Mental Status: She is alert and oriented to person, place, and time.   Psychiatric:         Mood and Affect: Mood normal.         Speech: Speech normal.         Behavior: Behavior normal. Behavior is cooperative.       Vitals:    12/17/24 1454   BP: 122/82   Pulse: 84   SpO2: 96%   Weight: 90.7 kg (200 lb)     Body mass index is 36.58 kg/m².     Results Review:   I reviewed the patient's new clinical results.    Admission on 12/10/2024, Discharged on 12/10/2024   Component Date Value Ref Range Status    Glucose 12/10/2024 106 (H)  65 - 99 mg/dL Final    BUN 12/10/2024 15  6 - 20 mg/dL Final    Creatinine 12/10/2024 0.75  0.57 - 1.00 mg/dL Final    Sodium 12/10/2024 136  136 - 145 mmol/L Final    Potassium 12/10/2024 4.0  3.5 - 5.2 mmol/L Final    Chloride 12/10/2024 101  98 - 107 mmol/L Final    CO2 12/10/2024 24.3  22.0 - 29.0 mmol/L Final    Calcium 12/10/2024 8.8  8.6 - 10.5 mg/dL Final    Total Protein 12/10/2024 7.7  6.0 - 8.5 g/dL Final    Albumin 12/10/2024 4.2  3.5 - 5.2 g/dL Final    ALT (SGPT) 12/10/2024 17  1 - 33 U/L Final    AST (SGOT) 12/10/2024 17  1 - 32 U/L Final    Alkaline Phosphatase 12/10/2024 100  39 - 117 U/L Final    Total Bilirubin 12/10/2024 0.4  0.0 - 1.2 mg/dL Final    Globulin 12/10/2024 3.5  gm/dL Final    A/G Ratio 12/10/2024 1.2  g/dL Final    BUN/Creatinine Ratio 12/10/2024 20.0  7.0 - 25.0 Final    Anion Gap 12/10/2024 10.7  5.0 - 15.0 mmol/L Final    eGFR 12/10/2024 104.0  >60.0 mL/min/1.73 Final    Lipase 12/10/2024 16  13 - 60 U/L Final    HS Troponin T 12/10/2024 <6  <14 ng/L Final    WBC 12/10/2024 9.95  3.40 - 10.80 10*3/mm3 Final    RBC 12/10/2024 4.83   3.77 - 5.28 10*6/mm3 Final    Hemoglobin 12/10/2024 13.1  12.0 - 15.9 g/dL Final    Hematocrit 12/10/2024 39.7  34.0 - 46.6 % Final    MCV 12/10/2024 82.2  79.0 - 97.0 fL Final    MCH 12/10/2024 27.1  26.6 - 33.0 pg Final    MCHC 12/10/2024 33.0  31.5 - 35.7 g/dL Final    RDW 12/10/2024 12.3  12.3 - 15.4 % Final    RDW-SD 12/10/2024 36.9 (L)  37.0 - 54.0 fl Final    MPV 12/10/2024 9.5  6.0 - 12.0 fL Final    Platelets 12/10/2024 296  140 - 450 10*3/mm3 Final      Comprehensive metabolic panel (04/21/2023 08:08)  CBC w AUTO Differential (04/21/2023 08:08)  TSH (04/21/2023 08:08)      COMPREHENSIVE METABOLIC PANEL (11/14/2024 20:08)  CBC with automated diff (11/14/2024 20:08)    US Gallbladder   4/18/2024  Increased echogenicity of the liver, suggestive of hepatic steatosis.  No evidence of cholelithiasis or acute cholecystitis.  No biliary ductal dilatation.    XR Chest 1 View     Result Date: 11/15/2024  No acute process.    US Gallbladder     Result Date: 12/10/2024  IMPRESSION: 1. Suspect fatty liver. 2. Normal gallbladder.     EGD 9/4/2024 per Dr. Carranza  - Normal oropharynx.  - Z-line irregular, 36 cm from the incisors.  - LA Grade A reflux esophagitis with no bleeding.  - Erosive gastropathy with no stigmata of recent bleeding. Biopsied.  - Normal duodenal bulb, first portion of the duodenum and third portion of the duodenum. Biopsied.  - Two small diverticulum 2nd part duodenum  A.     DUODENUM, BIOPSY:  Duodenal type mucosa with no significant histopathologic abnormalities  B.     ANTRUM AND BODY, BIOPSY:  Gastric mucosa with reactive (chemical) gastropathy  Negative for intestinal metaplasia or dysplasia  No Helicobacter pylori-like organisms seen     Assessment / Plan      1. Epigastric pain  2. Nausea and vomiting, unspecified vomiting type  3. Erosive gastropathy  4. Gastroesophageal reflux disease with esophagitis without hemorrhage  5. Marijuana use  Symptoms unchanged.  She has been taking omeprazole  40 mg daily.  She was recently seen in the ER and started on Carafate.  She is feeling better today.  Denies any GI bleeding.  She is trying to stop vaping and has cut back on marijuana use.  Basic labs unremarkable.  TSH normal.  Gallbladder ultrasound dated 4/18/2024 with fatty liver, otherwise unremarkable.  EGD dated 9/4/2024 with LA grade a reflux esophagitis and erosive gastropathy with no stigmata of recent bleeding.  Biopsies with chemical gastropathy, negative H. pylori, negative celiac.  Patient admits she does take a lot of ibuprofen on a regular basis for chronic headaches.  Gallbladder ultrasound dated 12/10/2024 unremarkable.    Antireflux measures  Advised to avoid vaping/smoking/marijuana use/THC use.  Continue omeprazole 40 mg daily.  Zofran as needed for nausea.  Advised to avoid all NSAIDs.  May use Tylenol products  Patient needs further evaluation of chronic headaches with alternative treatments in order to avoid frequent NSAID use.  Patient will discuss this with her PCP.  Labs    - CBC (No Diff); Future  - Comprehensive Metabolic Panel; Future  - TSH; Future  - IgA; Future  - Tissue Transglutaminase, IgA; Future    6. Fatty (change of) liver, not elsewhere classified  7. Class 2 obesity due to excess calories without serious comorbidity with body mass index (BMI) of 36.0 to 36.9 in adult  BMI 36.58  There is no history of elevated liver enzymes.  Gallbladder ultrasound dated 4/18/2024 with fatty liver noted.  Gallbladder ultrasound dated 12/10/2024 with liver mild fatty liver.  She drinks alcohol socially once a month on average.  She has used IV drugs in the past but only smokes marijuana occasionally now and is trying to cut back.  Advise low-fat diet, exercise and weight reduction.  Continue to avoid all drugs and cut back on alcohol.  Labs    - CBC (No Diff); Future  - Comprehensive Metabolic Panel; Future  - Protime-INR; Future  - Hepatitis A Antibody, Total; Future  - Hepatitis B Surface  Antibody; Future  - Hepatitis B Surface Antigen; Future  - Hepatitis B Core Antibody, Total; Future  - Hepatitis C Antibody; Future  - AGUIRRE Fibrosure Plus; Future    Patient Instructions   Antireflux measures: Avoid fried, fatty foods, alcohol, chocolate, coffee, tea,  soft drinks, all carbonated beverages (including sparkling water), peppermint and spearmint, spicy foods, tomatoes and tomato based foods, onions, peppers, and garlic.   Other antireflux measures include weight reduction if overweight, avoiding tight clothing around the abdomen, elevating the head of the bed 6 inches with blocks under the head board, and don't drink or eat before going to bed and avoid lying down immediately after meals.  Avoid vaping/smoking/marijuana/THC.  Omeprazole 40 mg 1 by mouth in the am 30 minutes before breakfast.  Zofran 4 mg 1 po every 8 hours as needed for nausea.  Avoid all NSAIDs including ibuprofen, Motrin, Advil, Aleve, Naprosyn, etc.  May take Tylenol products if needed.  Advised patient to make appointment with PCP to discuss alternative treatments for migraines.   The patient should eat 4-5 very small meals throughout the day. Avoid large meals.  It is recommended to eat a softer diet. Meats are best consumed ground. Fruits and vegetables are best consumed cooked or steamed and then mashed.   Low fiber, low fat diet with liberal water intake. May use soluble fiber.  Metamucil 1 packet/scoop daily or fiber gummies/capsules 2-4 per day.  Advised to exercise 30 minutes 4-5 days per week.   Advised to lose 20 pounds in the next 6-12 months.  Labs  Follow up: 6 months    MAX Wilson  12/17/2024    Please note that portions of this note were completed with a voice recognition program.     Patient or patient representative verbalized consent for the use of Ambient Listening during the visit with  MAX Wilson for chart documentation. 12/17/2024  15:30 EST

## 2025-01-16 ENCOUNTER — LAB (OUTPATIENT)
Dept: LAB | Facility: HOSPITAL | Age: 40
End: 2025-01-16
Payer: COMMERCIAL

## 2025-01-16 ENCOUNTER — OFFICE VISIT (OUTPATIENT)
Dept: INTERNAL MEDICINE | Facility: CLINIC | Age: 40
End: 2025-01-16
Payer: COMMERCIAL

## 2025-01-16 VITALS
TEMPERATURE: 97.5 F | BODY MASS INDEX: 36.62 KG/M2 | SYSTOLIC BLOOD PRESSURE: 126 MMHG | DIASTOLIC BLOOD PRESSURE: 71 MMHG | RESPIRATION RATE: 18 BRPM | WEIGHT: 199 LBS | OXYGEN SATURATION: 100 % | HEART RATE: 55 BPM | HEIGHT: 62 IN

## 2025-01-16 DIAGNOSIS — Z13.0 SCREENING FOR ENDOCRINE, NUTRITIONAL, METABOLIC AND IMMUNITY DISORDER: ICD-10-CM

## 2025-01-16 DIAGNOSIS — Z00.00 PHYSICAL EXAM, ANNUAL: Primary | ICD-10-CM

## 2025-01-16 DIAGNOSIS — R11.2 NAUSEA AND VOMITING, UNSPECIFIED VOMITING TYPE: Chronic | ICD-10-CM

## 2025-01-16 DIAGNOSIS — E66.09 CLASS 2 OBESITY DUE TO EXCESS CALORIES WITHOUT SERIOUS COMORBIDITY WITH BODY MASS INDEX (BMI) OF 36.0 TO 36.9 IN ADULT: ICD-10-CM

## 2025-01-16 DIAGNOSIS — Z13.21 SCREENING FOR ENDOCRINE, NUTRITIONAL, METABOLIC AND IMMUNITY DISORDER: ICD-10-CM

## 2025-01-16 DIAGNOSIS — F12.90 MARIJUANA USE: ICD-10-CM

## 2025-01-16 DIAGNOSIS — E66.812 CLASS 2 OBESITY DUE TO EXCESS CALORIES WITHOUT SERIOUS COMORBIDITY WITH BODY MASS INDEX (BMI) OF 36.0 TO 36.9 IN ADULT: ICD-10-CM

## 2025-01-16 DIAGNOSIS — Z13.228 SCREENING FOR ENDOCRINE, NUTRITIONAL, METABOLIC AND IMMUNITY DISORDER: ICD-10-CM

## 2025-01-16 DIAGNOSIS — R10.13 EPIGASTRIC PAIN: Chronic | ICD-10-CM

## 2025-01-16 DIAGNOSIS — Z13.29 SCREENING FOR ENDOCRINE, NUTRITIONAL, METABOLIC AND IMMUNITY DISORDER: ICD-10-CM

## 2025-01-16 DIAGNOSIS — E55.9 VITAMIN D INSUFFICIENCY: ICD-10-CM

## 2025-01-16 DIAGNOSIS — E03.9 HYPOTHYROIDISM, UNSPECIFIED TYPE: ICD-10-CM

## 2025-01-16 DIAGNOSIS — K76.0 FATTY (CHANGE OF) LIVER, NOT ELSEWHERE CLASSIFIED: Chronic | ICD-10-CM

## 2025-01-16 DIAGNOSIS — Z12.4 ENCOUNTER FOR PAPANICOLAOU SMEAR OF CERVIX: ICD-10-CM

## 2025-01-16 LAB
25(OH)D3 SERPL-MCNC: 18.6 NG/ML (ref 30–100)
ALBUMIN SERPL-MCNC: 4 G/DL (ref 3.5–5.2)
ALBUMIN/GLOB SERPL: 1.3 G/DL
ALP SERPL-CCNC: 90 U/L (ref 39–117)
ALT SERPL W P-5'-P-CCNC: 20 U/L (ref 1–33)
ANION GAP SERPL CALCULATED.3IONS-SCNC: 8 MMOL/L (ref 5–15)
AST SERPL-CCNC: 23 U/L (ref 1–32)
BASOPHILS # BLD AUTO: 0.06 10*3/MM3 (ref 0–0.2)
BASOPHILS NFR BLD AUTO: 0.7 % (ref 0–1.5)
BILIRUB SERPL-MCNC: 0.2 MG/DL (ref 0–1.2)
BUN SERPL-MCNC: 10 MG/DL (ref 6–20)
BUN/CREAT SERPL: 16.4 (ref 7–25)
CALCIUM SPEC-SCNC: 8.9 MG/DL (ref 8.6–10.5)
CHLORIDE SERPL-SCNC: 104 MMOL/L (ref 98–107)
CHOLEST SERPL-MCNC: 184 MG/DL (ref 0–200)
CO2 SERPL-SCNC: 24 MMOL/L (ref 22–29)
CREAT SERPL-MCNC: 0.61 MG/DL (ref 0.57–1)
DEPRECATED RDW RBC AUTO: 37 FL (ref 37–54)
EGFRCR SERPLBLD CKD-EPI 2021: 116.8 ML/MIN/1.73
EOSINOPHIL # BLD AUTO: 0.07 10*3/MM3 (ref 0–0.4)
EOSINOPHIL NFR BLD AUTO: 0.8 % (ref 0.3–6.2)
ERYTHROCYTE [DISTWIDTH] IN BLOOD BY AUTOMATED COUNT: 12.3 % (ref 12.3–15.4)
FERRITIN SERPL-MCNC: 44.8 NG/ML (ref 13–150)
GLOBULIN UR ELPH-MCNC: 3.2 GM/DL
GLUCOSE SERPL-MCNC: 98 MG/DL (ref 65–99)
HBA1C MFR BLD: 5.6 % (ref 4.8–5.6)
HBV SURFACE AB SER RIA-ACNC: REACTIVE
HBV SURFACE AG SERPL QL IA: NORMAL
HCT VFR BLD AUTO: 38.8 % (ref 34–46.6)
HCV AB SER QL: NORMAL
HDLC SERPL-MCNC: 42 MG/DL (ref 40–60)
HGB BLD-MCNC: 12.6 G/DL (ref 12–15.9)
IGA1 MFR SER: 230 MG/DL (ref 70–400)
IMM GRANULOCYTES # BLD AUTO: 0.03 10*3/MM3 (ref 0–0.05)
IMM GRANULOCYTES NFR BLD AUTO: 0.4 % (ref 0–0.5)
INR PPP: 0.99 (ref 0.9–1.1)
IRON 24H UR-MRATE: 54 MCG/DL (ref 37–145)
IRON SATN MFR SERPL: 12 % (ref 20–50)
LDLC SERPL CALC-MCNC: 128 MG/DL (ref 0–100)
LDLC/HDLC SERPL: 3.02 {RATIO}
LYMPHOCYTES # BLD AUTO: 2.43 10*3/MM3 (ref 0.7–3.1)
LYMPHOCYTES NFR BLD AUTO: 28.6 % (ref 19.6–45.3)
MCH RBC QN AUTO: 27 PG (ref 26.6–33)
MCHC RBC AUTO-ENTMCNC: 32.5 G/DL (ref 31.5–35.7)
MCV RBC AUTO: 83.3 FL (ref 79–97)
MONOCYTES # BLD AUTO: 0.49 10*3/MM3 (ref 0.1–0.9)
MONOCYTES NFR BLD AUTO: 5.8 % (ref 5–12)
NEUTROPHILS NFR BLD AUTO: 5.42 10*3/MM3 (ref 1.7–7)
NEUTROPHILS NFR BLD AUTO: 63.7 % (ref 42.7–76)
NRBC BLD AUTO-RTO: 0 /100 WBC (ref 0–0.2)
PLATELET # BLD AUTO: 355 10*3/MM3 (ref 140–450)
PMV BLD AUTO: 10.2 FL (ref 6–12)
POTASSIUM SERPL-SCNC: 3.7 MMOL/L (ref 3.5–5.2)
PROT SERPL-MCNC: 7.2 G/DL (ref 6–8.5)
PROTHROMBIN TIME: 13.6 SECONDS (ref 12.3–15.1)
RBC # BLD AUTO: 4.66 10*6/MM3 (ref 3.77–5.28)
SODIUM SERPL-SCNC: 136 MMOL/L (ref 136–145)
T4 FREE SERPL-MCNC: 1.18 NG/DL (ref 0.92–1.68)
TIBC SERPL-MCNC: 460 MCG/DL (ref 298–536)
TRANSFERRIN SERPL-MCNC: 309 MG/DL (ref 200–360)
TRIGL SERPL-MCNC: 76 MG/DL (ref 0–150)
TSH SERPL DL<=0.05 MIU/L-ACNC: 1.77 UIU/ML (ref 0.27–4.2)
VIT B12 BLD-MCNC: 347 PG/ML (ref 211–946)
VLDLC SERPL-MCNC: 14 MG/DL (ref 5–40)
WBC NRBC COR # BLD AUTO: 8.5 10*3/MM3 (ref 3.4–10.8)

## 2025-01-16 PROCEDURE — 86803 HEPATITIS C AB TEST: CPT

## 2025-01-16 PROCEDURE — 85610 PROTHROMBIN TIME: CPT

## 2025-01-16 PROCEDURE — 83883 ASSAY NEPHELOMETRY NOT SPEC: CPT

## 2025-01-16 PROCEDURE — 84478 ASSAY OF TRIGLYCERIDES: CPT

## 2025-01-16 PROCEDURE — 86704 HEP B CORE ANTIBODY TOTAL: CPT

## 2025-01-16 PROCEDURE — 82977 ASSAY OF GGT: CPT

## 2025-01-16 PROCEDURE — 80053 COMPREHEN METABOLIC PANEL: CPT

## 2025-01-16 PROCEDURE — 83010 ASSAY OF HAPTOGLOBIN QUANT: CPT

## 2025-01-16 PROCEDURE — 80061 LIPID PANEL: CPT | Performed by: NURSE PRACTITIONER

## 2025-01-16 PROCEDURE — 83540 ASSAY OF IRON: CPT | Performed by: NURSE PRACTITIONER

## 2025-01-16 PROCEDURE — 85025 COMPLETE CBC W/AUTO DIFF WBC: CPT | Performed by: NURSE PRACTITIONER

## 2025-01-16 PROCEDURE — 82172 ASSAY OF APOLIPOPROTEIN: CPT

## 2025-01-16 PROCEDURE — 83036 HEMOGLOBIN GLYCOSYLATED A1C: CPT | Performed by: NURSE PRACTITIONER

## 2025-01-16 PROCEDURE — 82306 VITAMIN D 25 HYDROXY: CPT | Performed by: NURSE PRACTITIONER

## 2025-01-16 PROCEDURE — 86706 HEP B SURFACE ANTIBODY: CPT

## 2025-01-16 PROCEDURE — 82784 ASSAY IGA/IGD/IGG/IGM EACH: CPT

## 2025-01-16 PROCEDURE — 36415 COLL VENOUS BLD VENIPUNCTURE: CPT

## 2025-01-16 PROCEDURE — 82728 ASSAY OF FERRITIN: CPT | Performed by: NURSE PRACTITIONER

## 2025-01-16 PROCEDURE — 82607 VITAMIN B-12: CPT | Performed by: NURSE PRACTITIONER

## 2025-01-16 PROCEDURE — 84443 ASSAY THYROID STIM HORMONE: CPT

## 2025-01-16 PROCEDURE — 84466 ASSAY OF TRANSFERRIN: CPT | Performed by: NURSE PRACTITIONER

## 2025-01-16 PROCEDURE — 84439 ASSAY OF FREE THYROXINE: CPT | Performed by: NURSE PRACTITIONER

## 2025-01-16 PROCEDURE — 86708 HEPATITIS A ANTIBODY: CPT

## 2025-01-16 PROCEDURE — 86364 TISS TRNSGLTMNASE EA IG CLAS: CPT

## 2025-01-16 PROCEDURE — 82465 ASSAY BLD/SERUM CHOLESTEROL: CPT

## 2025-01-16 PROCEDURE — 87340 HEPATITIS B SURFACE AG IA: CPT
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
## 2025-01-17 LAB
HAV AB SER QL IA: NEGATIVE
HBV CORE AB SERPL QL IA: NEGATIVE
TTG IGA SER-ACNC: <2 U/ML (ref 0–3)

## 2025-01-21 LAB
A2 MACROGLOB SERPL-MCNC: 119 MG/DL (ref 110–276)
ALT SERPL W P-5'-P-CCNC: 18 IU/L (ref 0–40)
APO A-I SERPL-MCNC: 124 MG/DL (ref 116–209)
AST SERPL W P-5'-P-CCNC: 23 IU/L (ref 0–40)
BILIRUB SERPL-MCNC: 0.2 MG/DL (ref 0–1.2)
CHOLEST SERPL-MCNC: 186 MG/DL (ref 100–199)
FIBROSIS SCORING:: ABNORMAL
FIBROSIS STAGE SERPL QL: ABNORMAL
GGT SERPL-CCNC: 14 IU/L (ref 0–60)
GLUCOSE SERPL-MCNC: 101 MG/DL (ref 70–99)
HAPTOGLOB SERPL-MCNC: 165 MG/DL (ref 33–278)
LABORATORY COMMENT REPORT: ABNORMAL
LIVER FIBR SCORE SERPL CALC.FIBROSURE: 0.02 (ref 0–0.21)
LIVER STEATOSIS GRADE SERPL QL: ABNORMAL
LIVER STEATOSIS SCORE SERPL: 0.4 (ref 0–0.4)
NASH GRADE SERPL QL: ABNORMAL
NASH INTERPRETATION SERPL-IMP: ABNORMAL
NASH SCORE SERPL: 0.14 (ref 0–0.25)
NASH SCORING: ABNORMAL
STEATOSIS SCORING: ABNORMAL
TEST PERFORMANCE INFO SPEC: ABNORMAL
TEST PERFORMANCE INFO SPEC: ABNORMAL
TRIGL SERPL-MCNC: 93 MG/DL (ref 0–149)

## 2025-01-22 RX ORDER — ERGOCALCIFEROL 1.25 MG/1
50000 CAPSULE, LIQUID FILLED ORAL WEEKLY
Qty: 12 CAPSULE | Refills: 0 | Status: SHIPPED | OUTPATIENT
Start: 2025-01-22

## 2025-01-22 NOTE — PROGRESS NOTES
Please contact patient and let her know that vitamin D is deficient and we can send in 50,000 for 12 weeks and then patient needs to take a daily supplement of vitamin D3 2000 over-the-counter once Rx is completed and in the wintertime recommend taking 4000 vitamin D3 daily.  Patient's LDL is elevated recommend dietary modifications along with exercise.  Iron saturation was low recommend increasing iron in diet along with some vitamin C to help aid in absorption CBC within normal limits.  Patient's B12 was 347 I like it around 400 so please eat foods that are high in B12

## 2025-02-03 ENCOUNTER — TELEMEDICINE (OUTPATIENT)
Dept: FAMILY MEDICINE CLINIC | Facility: TELEHEALTH | Age: 40
End: 2025-02-03
Payer: COMMERCIAL

## 2025-02-03 DIAGNOSIS — J40 BRONCHITIS: Primary | ICD-10-CM

## 2025-02-03 PROCEDURE — 99213 OFFICE O/P EST LOW 20 MIN: CPT | Performed by: NURSE PRACTITIONER

## 2025-02-03 RX ORDER — DEXTROMETHORPHAN HYDROBROMIDE AND PROMETHAZINE HYDROCHLORIDE 15; 6.25 MG/5ML; MG/5ML
5 SYRUP ORAL 4 TIMES DAILY PRN
Qty: 118 ML | Refills: 0 | Status: SHIPPED | OUTPATIENT
Start: 2025-02-03

## 2025-02-03 RX ORDER — ALBUTEROL SULFATE 90 UG/1
2 INHALANT RESPIRATORY (INHALATION) EVERY 4 HOURS PRN
Qty: 18 G | Refills: 0 | Status: SHIPPED | OUTPATIENT
Start: 2025-02-03

## 2025-02-04 NOTE — PATIENT INSTRUCTIONS
Push fluids, rest  Tylenol/ibuprofen for pain or fever>101  Continue prednisone, can use tessalon pearles during the day and prometh-dm at night  Follow up with pcp as scheduled.

## 2025-02-04 NOTE — PROGRESS NOTES
Subjective   Sarita Pace is a 39 y.o. female.     History of Present Illness  She presents with c/o uncontrollable, harsh cough for 3 days. She went to the ER on Friday and tested negative for covid, flu, and strep. She was given prednisone, and tessalon pearles and that has not been helping. She says that even prior to 3 days ago she was having episodes of coughing that were so harsh she would vomit.  She has a pcp appointment coming up in 10 days.       The following portions of the patient's history were reviewed and updated as appropriate: allergies, current medications, past family history, past medical history, past social history, past surgical history, and problem list.    Review of Systems   Constitutional:  Negative for fever.   Respiratory:  Positive for cough and shortness of breath.    Gastrointestinal:  Positive for vomiting.   Musculoskeletal:  Negative for myalgias.       Objective   Physical Exam  Constitutional:       General: She is not in acute distress.     Appearance: She is well-developed. She is not diaphoretic.   Pulmonary:      Effort: Pulmonary effort is normal.   Neurological:      Mental Status: She is alert and oriented to person, place, and time.   Psychiatric:         Behavior: Behavior normal.           Assessment & Plan   Diagnoses and all orders for this visit:    1. Bronchitis (Primary)  -     albuterol sulfate  (90 Base) MCG/ACT inhaler; Inhale 2 puffs Every 4 (Four) Hours As Needed for Wheezing or Shortness of Air.  Dispense: 18 g; Refill: 0  -     promethazine-dextromethorphan (PROMETHAZINE-DM) 6.25-15 MG/5ML syrup; Take 5 mL by mouth 4 (Four) Times a Day As Needed for Cough.  Dispense: 118 mL; Refill: 0      Continue prednisone, can use tessalon pearles during the day and prometh-dm at night  Follow up with pcp as scheduled           The use of a video visit has been reviewed with the patient and verbal informed consent has been obtained. Myself and Sarita  Jess Pace participated in this visit. The patient is located in  [place], [place] . I am located in [place], [place]. [employer] and [employer] Video Client were utilized. I spent 10 minutes in the patient's chart for this visit.

## 2025-02-05 ENCOUNTER — HOSPITAL ENCOUNTER (EMERGENCY)
Facility: HOSPITAL | Age: 40
Discharge: HOME | End: 2025-02-05
Attending: STUDENT IN AN ORGANIZED HEALTH CARE EDUCATION/TRAINING PROGRAM | Admitting: STUDENT IN AN ORGANIZED HEALTH CARE EDUCATION/TRAINING PROGRAM
Payer: COMMERCIAL

## 2025-02-05 ENCOUNTER — APPOINTMENT (OUTPATIENT)
Dept: GENERAL RADIOLOGY | Facility: HOSPITAL | Age: 40
End: 2025-02-05
Payer: COMMERCIAL

## 2025-02-05 VITALS
RESPIRATION RATE: 18 BRPM | WEIGHT: 197 LBS | HEART RATE: 81 BPM | BODY MASS INDEX: 36.25 KG/M2 | HEIGHT: 62 IN | DIASTOLIC BLOOD PRESSURE: 68 MMHG | TEMPERATURE: 98.2 F | SYSTOLIC BLOOD PRESSURE: 126 MMHG | OXYGEN SATURATION: 97 %

## 2025-02-05 DIAGNOSIS — J10.1 INFLUENZA A: Primary | ICD-10-CM

## 2025-02-05 DIAGNOSIS — U07.1 COVID-19: ICD-10-CM

## 2025-02-05 DIAGNOSIS — R05.1 ACUTE COUGH: ICD-10-CM

## 2025-02-05 LAB
FLUAV RNA RESP QL NAA+PROBE: DETECTED
FLUBV RNA RESP QL NAA+PROBE: NOT DETECTED
SARS-COV-2 RNA RESP QL NAA+PROBE: DETECTED
TROPONIN T SERPL HS-MCNC: <6 NG/L

## 2025-02-05 PROCEDURE — 84484 ASSAY OF TROPONIN QUANT: CPT | Performed by: STUDENT IN AN ORGANIZED HEALTH CARE EDUCATION/TRAINING PROGRAM

## 2025-02-05 PROCEDURE — 93005 ELECTROCARDIOGRAM TRACING: CPT | Performed by: STUDENT IN AN ORGANIZED HEALTH CARE EDUCATION/TRAINING PROGRAM

## 2025-02-05 PROCEDURE — 71046 X-RAY EXAM CHEST 2 VIEWS: CPT

## 2025-02-05 PROCEDURE — 99284 EMERGENCY DEPT VISIT MOD MDM: CPT | Performed by: STUDENT IN AN ORGANIZED HEALTH CARE EDUCATION/TRAINING PROGRAM

## 2025-02-05 PROCEDURE — 87636 SARSCOV2 & INF A&B AMP PRB: CPT | Performed by: STUDENT IN AN ORGANIZED HEALTH CARE EDUCATION/TRAINING PROGRAM

## 2025-02-05 NOTE — Clinical Note
[facility] EMERGENCY DEPARTMENT  [address] [address]  Phone: [phone]    Sarita Pace was seen and treated in our emergency department on 2/5/2025.  She may return to work on 02/10/2025.         Thank you for choosing [facility].    Gordon Sierra,

## 2025-02-05 NOTE — ED PROVIDER NOTES
[facility]  Emergency Department Encounter  Emergency Medicine Physician Note       Pt Name: Sarita Pace  MRN: [id number]  Pt :   1985  Room Number:    Date of encounter:  2025  PCP: Ayesha Mathew APRN  ED Physician: Gordon Sierra DO    HPI:  Sarita Pace is a 39 y.o. female who presents to the ED with chief complaint of FLU-LIKE SYMPTOMS.    Symptoms: Cough, shortness of breath, body aches, nausea, generalized chest pain with coughing.  Onset: 4 days ago  Timing: Gradual  Duration: Constant  Modifying factors: Denies  Associated symptoms: Denies fever, abdominal pain, back pain, problems with urination or bowel movements, leg pain or swelling.    Patient reports that she was seen in the ED on Saturday diarrhea.  Tested negative for COVID or flu.  Was started on Tessalon Perles and steroids without any relief of symptoms.  She underwent a televisit on Tuesday.  They prescribed an inhaler and cough syrup with minimal improvement of symptoms.  Persistent symptoms prompted ED reevaluation today.    Vapes daily.    PAST MEDICAL HISTORY  Past Medical History:   Diagnosis Date    Anxiety     Bipolar 1 disorder     Fatty liver     Full dentures     GERD (gastroesophageal reflux disease)     Hypothyroidism     Migraine     Shingles      Current Outpatient Medications   Medication Instructions    albuterol sulfate  (90 Base) MCG/ACT inhaler 2 puffs, Inhalation, Every 4 Hours PRN    omeprazole (PRILOSEC) 40 mg, Oral, Daily    ondansetron ODT (ZOFRAN-ODT) 4 mg, Translingual, Every 8 Hours PRN    promethazine-dextromethorphan (PROMETHAZINE-DM) 6.25-15 MG/5ML syrup 5 mL, Oral, 4 Times Daily PRN    vitamin D (ERGOCALCIFEROL) 50,000 Units, Oral, Weekly      PAST SURGICAL HISTORY  Past Surgical History:   Procedure Laterality Date     SECTION      ENDOSCOPY N/A 2024    Procedure: Esophagogastroduodenoscopy with biopsy;  Surgeon: Tere  Angelo SPARKS MD;  [facility] ENDOSCOPY;  Service: Gastroenterology;  Laterality: N/A;    TONSILLECTOMY N/A 2001    WISDOM TOOTH EXTRACTION N/A 2001       FAMILY HISTORY  Family History   Problem Relation Age of Onset    Diabetes Mother     Diabetes Father     Colon cancer Neg Hx        SOCIAL HISTORY  Social History     Socioeconomic History    Marital status:    Tobacco Use    Smoking status: Former     Types: Cigarettes    Smokeless tobacco: Never   Vaping Use    Vaping status: Every Day    Devices: Disposable   Substance and Sexual Activity    Alcohol use: Yes     Comment: monthly maybe    Drug use: Not Currently     Types: Amphetamines, Benzodiazepines, Cocaine(coke), Heroin, Hydrocodone, Marijuana, Methamphetamines     Comment: history of  IV drug use and snorting - currently uses marijuana    Sexual activity: Yes     Partners: Male     Birth control/protection: Tubal ligation     ALLERGIES  Patient has no known allergies.    REVIEW OF SYSTEMS  All systems reviewed and negative except for those discussed in HPI.     PHYSICAL EXAM  ED Triage Vitals   Temp Heart Rate Resp BP SpO2   02/05/25 0758 02/05/25 0748 02/05/25 0748 02/05/25 0748 02/05/25 0748   98.2 °F (36.8 °C) 93 18 128/81 97 %      Temp src Heart Rate Source Patient Position BP Location FiO2 (%)   02/05/25 0748 02/05/25 0748 -- 02/05/25 0748 --   Oral Monitor  Left arm      I have reviewed the triage vital signs and nursing notes.    General: Alert.  Nontoxic appearance.  No acute distress.  Head: Normocephalic.  Atraumatic.  Eyes: No scleral icterus.  ENT: Moist mucous membranes.  Cardiovascular: Regular rate and rhythm.  No murmurs.  No rubs.  2+ distal pulses bilaterally.  Respiratory: Equal breath sounds bilaterally. No wheezing. No rales.  No rhonchi.  GI: Abdomen is soft.  Nondistended.  Nontender to palpation.  No rebound.  No guarding.  No CVA tenderness.  MSK: Moves all 4 extremities. + reproducible tenderness along the anterior  chest wall.  Neurologic: Oriented x 3.  No focal deficits.  Skin: No edema. No erythema. No pallor. No cyanosis.  Psych: Normal mood and affect.    LAB RESULTS  No results found for this or any previous visit (from the past 24 hours).      RADIOLOGY  No Radiology Exams Resulted Within Past 24 Hours    PROCEDURES  Procedures    RISK STRATIFICATION    MEDICAL DECISION MAKING  39 y.o. female with past medical history listed above who presents with flu-like symptoms.    Vital signs within normal limits. Pulse ox 97% on room air.    Based on clinical presentation and physical exam, differential diagnosis includes, but is not limited to, viral URI, physicalbronchitis, influenza, COVID, pneumonia. Presentation not consistent with acute coronary syndrome, myocarditis, pulmonary embolism, aortic dissection.    At least 3 different tests have been ordered on this patient.    Please see ED course below for my interpretation of the ED workup.  ED Course as of 02/06/25 1046   Wed Feb 05, 2025   0800 ECG 12 Lead Chest Pain  EKG per my interpretation normal sinus rhythm, rate 89, normal axis, no STEMI, normal QRS QTC intervals.   [JS]   0835 I reviewed the labs listed above.  [JS]   0835 COVID19(!!): Detected [JS]   0835 Influenza A PCR(!): Detected [JS]   0835 XR Chest 2 View  I have independently reviewed and interpreted the chest x-ray.  My interpretation is negative for infiltrate.    [JS]   0835 HS Troponin T: <6 [JS]      ED Course User Index  [JS] Gordon Sierra DO     On re-evaluation, patient resting comfortably. Vital signs remained stable on room air.  Patient was ambulatory in the ED with steady gait.  Able to tolerate oral intake appropriately.    I discussed the findings of the ED workup with the patient at bedside.  No clinical indication for admission.  I recommended outpatient follow-up with PCP.  Patient was deemed medically stable for discharge with close outpatient follow-up and strict ED return precautions.  Patient agreeable with plan and disposition.    Chronic conditions affecting care: None    Social determinants of health impacting treatment or disposition: Vape abuse    REPEAT VITAL SIGNS  AS OF 10:46 EST VITALS:  BP - 126/68  HR - 81  TEMP - 98.2 °F (36.8 °C)  O2 SATS - 97%    DIAGNOSIS  Final diagnoses:   Influenza A   COVID-19   Acute cough     DISPOSITION  ED Disposition       ED Disposition   Discharge    Condition   Stable    Comment   --               PATIENT REFERRALS  Ayesha Mathew, APRN  [address] [address] [phone]      PCP. 48 hours.            Please note that portions of this document were completed with voice recognition software.        Gordon Sierra DO  02/06/25 1048

## 2025-02-13 ENCOUNTER — OFFICE VISIT (OUTPATIENT)
Dept: INTERNAL MEDICINE | Facility: CLINIC | Age: 40
End: 2025-02-13
Payer: COMMERCIAL

## 2025-02-13 VITALS
SYSTOLIC BLOOD PRESSURE: 122 MMHG | OXYGEN SATURATION: 98 % | TEMPERATURE: 97.7 F | DIASTOLIC BLOOD PRESSURE: 72 MMHG | WEIGHT: 202.4 LBS | HEART RATE: 84 BPM | BODY MASS INDEX: 37.25 KG/M2 | HEIGHT: 62 IN

## 2025-02-13 DIAGNOSIS — U09.9 POST-COVID SYNDROME: Primary | ICD-10-CM

## 2025-02-13 DIAGNOSIS — Z13.31 SCREENING FOR DEPRESSION: ICD-10-CM

## 2025-02-13 RX ORDER — PREDNISONE 5 MG/1
1 TABLET ORAL TAKE AS DIRECTED
Qty: 21 EACH | Refills: 0 | Status: SHIPPED | OUTPATIENT
Start: 2025-02-13

## 2025-02-13 RX ORDER — DOXYCYCLINE 100 MG/1
100 TABLET ORAL 2 TIMES DAILY
Qty: 20 TABLET | Refills: 0 | Status: SHIPPED | OUTPATIENT
Start: 2025-02-13 | End: 2025-02-23

## 2025-02-19 NOTE — PROGRESS NOTES
Chief Complaint / Reason:      Chief Complaint   Patient presents with    Hospital Follow Up Visit     Pt stated she tested positive for Covid and Flu A at ER on 2..25    Cough    Headache    Chest Pain    Nasal Congestion    Earache     Pt states mostly left ear        Subjective     History of Present Illness  The patient is a 39-year-old female presenting for ER follow-up after testing positive for COVID-19 and influenza A.    She reports persistent symptoms since 2025, including shortness of breath and a productive cough with clear sputum. Sinuses are clear. She sought emergency care at [facility] ER on 2025, where no antibiotics or steroids were prescribed. Previously on a cough suppressant, now deemed unnecessary. Using an inhaler for symptom management. Has not replaced her toothbrush since illness onset. Describes a sensation of being in a hole and reports  avoiding close contact due to financial constraints. Missed a week of work. No history of yeast infections and believes she can tolerate doxycycline. Visited ER in [facility] on 2025, where all tests were negative.    History taken from: patient    PMH/FH/Social History were reviewed and updated appropriately in the electronic medical record.   Past Medical History:   Diagnosis Date    Anxiety     Bipolar 1 disorder     Fatty liver     Full dentures     GERD (gastroesophageal reflux disease)     Hypothyroidism     Migraine     Shingles      Past Surgical History:   Procedure Laterality Date     SECTION      ENDOSCOPY N/A 2024    Procedure: Esophagogastroduodenoscopy with biopsy;  Surgeon: Angelo Carranza MD;  Location: [facility] ENDOSCOPY;  Service: Gastroenterology;  Laterality: N/A;    TONSILLECTOMY N/A     WISDOM TOOTH EXTRACTION N/A      Social History     Socioeconomic History    Marital status:    Tobacco Use    Smoking status: Former     Types: Cigarettes     Passive exposure:  Past    Smokeless tobacco: Never   Vaping Use    Vaping status: Every Day    Substances: Nicotine    Devices: Refillable tank   Substance and Sexual Activity    Alcohol use: Yes     Comment: monthly maybe    Drug use: Not Currently     Types: Amphetamines, Benzodiazepines, Cocaine(coke), Heroin, Hydrocodone, Marijuana, Methamphetamines     Comment: history of  IV drug use and snorting - currently uses marijuana    Sexual activity: Yes     Partners: Male     Birth control/protection: Tubal ligation     Family History   Problem Relation Age of Onset    Diabetes Mother     Diabetes Father     Colon cancer Neg Hx        Review of Systems:   Review of Systems      All other systems were reviewed and are negative.  Exceptions are noted in the subjective or above.      Objective     Vital Signs  Vitals:    02/13/25 1320   BP: 122/72   Pulse: 84   Temp: 97.7 °F (36.5 °C)   SpO2: 98%       Body mass index is 37.01 kg/m².         Physical Exam  Vitals and nursing note reviewed.   Constitutional:       General: She is not in acute distress.     Appearance: She is well-developed. She is obese. She is not diaphoretic.   HENT:      Head: Normocephalic and atraumatic.      Nose: Nose normal.   Neck:      Vascular: No JVD.   Cardiovascular:      Rate and Rhythm: Normal rate and regular rhythm.      Heart sounds: Normal heart sounds. No murmur heard.  Pulmonary:      Effort: Pulmonary effort is normal. No respiratory distress.      Breath sounds: No stridor. Wheezing present.   Musculoskeletal:      Cervical back: Neck supple.   Lymphadenopathy:      Cervical: No cervical adenopathy.   Skin:     General: Skin is warm and dry.      Capillary Refill: Capillary refill takes less than 2 seconds.   Neurological:      Mental Status: She is alert and oriented to person, place, and time.   Psychiatric:         Behavior: Behavior normal.         Thought Content: Thought content normal.         Judgment: Judgment normal.              Results  Review:    I reviewed the patient's new clinical results.   PHQ-9 Depression Screening  Little interest or pleasure in doing things? Not at all   Feeling down, depressed, or hopeless? Not at all   PHQ-2 Total Score 0   Trouble falling or staying asleep, or sleeping too much? Several days   Feeling tired or having little energy? Several days   Poor appetite or overeating? Over half   Feeling bad about yourself - or that you are a failure or have let yourself or your family down? Not at all   Trouble concentrating on things, such as reading the newspaper or watching television? Several days   Moving or speaking so slowly that other people could have noticed? Or the opposite - being so fidgety or restless that you have been moving around a lot more than usual? Not at all   Thoughts that you would be better off dead, or of hurting yourself in some way? Not at all   PHQ-9 Total Score 5   If you checked off any problems, how difficult have these problems made it for you to do your work, take care of things at home, or get along with other people?  (Pt did not answer question)      2/13/2025   Anxiety PONCE-7    Feeling nervous, anxious or on edge 1    Not being able to stop or control worrying 0    Worrying too much about different things 0    Trouble Relaxing 0    Being so restless that it is hard to sit still 0    Becoming easily annoyed or irritable 0    Feeling afraid as if something awful might happen 0    PONCE 7 Total Score 1    If you checked any problems, how difficult have these problems made it for you to do your work, take care of things at home, or get along with other people --            Medication Review:     Current Outpatient Medications:     albuterol sulfate  (90 Base) MCG/ACT inhaler, Inhale 2 puffs Every 4 (Four) Hours As Needed for Wheezing or Shortness of Air., Disp: 18 g, Rfl: 0    omeprazole (priLOSEC) 40 MG capsule, Take 1 capsule by mouth Daily. Indications: Esophagus Inflammation with  Erosion, Disp: 90 capsule, Rfl: 1    ondansetron ODT (ZOFRAN-ODT) 4 MG disintegrating tablet, Place 1 tablet on the tongue Every 8 (Eight) Hours As Needed for Nausea or Vomiting., Disp: 30 tablet, Rfl: 0    vitamin D (ERGOCALCIFEROL) 1.25 MG (41297 UT) capsule capsule, Take 1 capsule by mouth 1 (One) Time Per Week., Disp: 12 capsule, Rfl: 0    doxycycline (ADOXA) 100 MG tablet, Take 1 tablet by mouth 2 (Two) Times a Day for 10 days., Disp: 20 tablet, Rfl: 0    predniSONE 5 MG (21) tablet therapy pack dose pack, Take 1 tablet by mouth Take As Directed. Take as directed on package instructions., Disp: 21 each, Rfl: 0    Diagnoses and all orders for this visit:    Post-COVID syndrome  -     predniSONE 5 MG (21) tablet therapy pack dose pack; Take 1 tablet by mouth Take As Directed. Take as directed on package instructions.  -     doxycycline (ADOXA) 100 MG tablet; Take 1 tablet by mouth 2 (Two) Times a Day for 10 days.    Screening for depression      Assessment & Plan  1. Post-ER follow-up for COVID-19 and influenza A  - Oxygen saturation levels improved  - Bronchial wall thickening likely due to inflammation  - Advised to replace toothbrush and oral hygiene products  - Continue inhaler use with mouth rinsing post-use  - Perform salt water gargles and use mouthwash intermittently  - Deep breathing exercises: large breaths, hold for 5 seconds, exhale, 10 times every hour while awake  - Maintain physical activity  - Avoid daytime cough suppressants, use at night if needed  - Return for vaccinations once health improves  - Initiate steroids for ear and sinus inflammation  - Prescribe doxycycline, sent to [facility] in [place]    Return if symptoms worsen or fail to improve.    MAX Jiménez  02/13/2025    Patient or patient representative verbalized consent for the use of Ambient Listening during the visit with  MAX Jiménez for chart documentation.

## 2025-04-11 ENCOUNTER — OFFICE VISIT (OUTPATIENT)
Dept: INTERNAL MEDICINE | Facility: CLINIC | Age: 40
End: 2025-04-11
Payer: COMMERCIAL

## 2025-04-11 VITALS
WEIGHT: 204 LBS | OXYGEN SATURATION: 100 % | DIASTOLIC BLOOD PRESSURE: 70 MMHG | HEART RATE: 86 BPM | RESPIRATION RATE: 18 BRPM | SYSTOLIC BLOOD PRESSURE: 117 MMHG | BODY MASS INDEX: 37.54 KG/M2 | HEIGHT: 62 IN | TEMPERATURE: 97.8 F

## 2025-04-11 DIAGNOSIS — N64.52 NIPPLE DISCHARGE: ICD-10-CM

## 2025-04-11 DIAGNOSIS — N64.4 BREAST TENDERNESS: Primary | ICD-10-CM

## 2025-04-11 DIAGNOSIS — Z80.3 FAMILY HISTORY OF BREAST CANCER: ICD-10-CM

## 2025-04-11 PROCEDURE — 1160F RVW MEDS BY RX/DR IN RCRD: CPT | Performed by: NURSE PRACTITIONER

## 2025-04-11 PROCEDURE — 1159F MED LIST DOCD IN RCRD: CPT | Performed by: NURSE PRACTITIONER

## 2025-04-11 PROCEDURE — 99213 OFFICE O/P EST LOW 20 MIN: CPT | Performed by: NURSE PRACTITIONER

## 2025-04-11 PROCEDURE — 1126F AMNT PAIN NOTED NONE PRSNT: CPT | Performed by: NURSE PRACTITIONER

## 2025-04-11 NOTE — PROGRESS NOTES
"  Office Visit      Patient Name: Sarita Pace  : 1985   MRN: [id number]   Care Team: Patient Care Team:  Ayesha Mathew APRN as PCP - General (Family Medicine)  Belkys Barber APRN as Nurse Practitioner (Behavioral Health)  Gilda [place] APRN as Nurse Practitioner (Gastroenterology)    Chief Complaint  Breast Pain (Right sided for a few weeks.)    Subjective     Subjective      Sarita Pace presents to [facility] for right breast pain.   Symptoms started two weeks ago.   Feels a dull constant pain with intermittent sharp shooting pain of the lower breast.   Family history of breast cancer in a maternal great aunt.   She also complains of intermittent nipple discharge, found on her sports bra. Can't recall which nipple this was.   Denies excess caffeine use, breast lump, new abdominal pain, or increase in physical activity. There is no back pain associated.  She does suffer from gastritis but has never had pain like this previously.     Never had a mammogram    Objective     Objective   Vital Signs:   /70   Pulse 86   Temp 97.8 °F (36.6 °C) (Temporal)   Resp 18   Ht 157.5 cm (62\")   Wt 92.5 kg (204 lb)   SpO2 100%   BMI 37.31 kg/m²     Physical Exam  Vitals and nursing note reviewed. Exam conducted with a chaperone present (Alexandra Epley, [employer] APRN student).   Cardiovascular:      Rate and Rhythm: Normal rate and regular rhythm.      Heart sounds: Normal heart sounds. No murmur heard.  Pulmonary:      Breath sounds: Normal breath sounds.   Chest:   Breasts:     Right: Tenderness (as marked) present. No inverted nipple, nipple discharge or skin change.      Left: Normal.          Comments: Dense breast tissue bilaterally    Lymphadenopathy:      Upper Body:      Right upper body: No supraclavicular, axillary or pectoral adenopathy.      Left upper body: No supraclavicular, axillary or pectoral adenopathy.   Skin:     " General: Skin is warm and dry.      Findings: No erythema or rash.          Assessment / Plan      Assessment & Plan   Problem List Items Addressed This Visit    None  Visit Diagnoses         Breast tenderness    -  Primary    Relevant Orders    Mammo Diagnostic Digital Tomosynthesis Bilateral With CAD      Family history of breast cancer        Relevant Orders    Mammo Diagnostic Digital Tomosynthesis Bilateral With CAD      Nipple discharge        Relevant Orders    Mammo Diagnostic Digital Tomosynthesis Bilateral With CAD    No obvious lump but has significant dense breast tissue. Will obtain diagnostic mammogram.  Encouraged heat and ibuprofen PRN. Return with worsening or persisting.             Follow Up   Return if symptoms worsen or fail to improve.  Patient was given instructions and counseling regarding her condition or for health maintenance advice. Please see specific information pulled into the AVS if appropriate.     MAX Durbin  [facility]

## 2025-05-19 ENCOUNTER — HOSPITAL ENCOUNTER (OUTPATIENT)
Dept: MAMMOGRAPHY | Facility: HOSPITAL | Age: 40
Discharge: HOME | End: 2025-05-19
Payer: COMMERCIAL

## 2025-05-19 ENCOUNTER — HOSPITAL ENCOUNTER (OUTPATIENT)
Dept: ULTRASOUND IMAGING | Facility: HOSPITAL | Age: 40
Discharge: HOME | End: 2025-05-19
Payer: COMMERCIAL

## 2025-05-19 DIAGNOSIS — N64.4 BREAST TENDERNESS: ICD-10-CM

## 2025-05-19 DIAGNOSIS — Z80.3 FAMILY HISTORY OF BREAST CANCER: ICD-10-CM

## 2025-05-19 DIAGNOSIS — N64.52 NIPPLE DISCHARGE: ICD-10-CM

## 2025-05-19 DIAGNOSIS — R92.8 ABNORMAL MAMMOGRAM OF LEFT BREAST: ICD-10-CM

## 2025-05-19 PROCEDURE — 77066 DX MAMMO INCL CAD BI: CPT

## 2025-05-19 PROCEDURE — 76642 ULTRASOUND BREAST LIMITED: CPT

## 2025-05-19 PROCEDURE — G0279 TOMOSYNTHESIS, MAMMO: HCPCS

## 2025-06-20 ENCOUNTER — TELEPHONE (OUTPATIENT)
Dept: GASTROENTEROLOGY | Facility: CLINIC | Age: 40
End: 2025-06-20
Payer: COMMERCIAL

## 2025-06-20 NOTE — TELEPHONE ENCOUNTER
SPOKE WITH PATIENT ABOUT RESCHEDULING HER APPT SHE STATED THAT SHE HAS STARTED A NEW JOB AND NEEDS TO WAIT FOR HER WORK SCHEDULE IN ORDER TO RESCHEDULE.  SHE WILL CALL BACK.

## 2025-07-14 ENCOUNTER — TELEMEDICINE (OUTPATIENT)
Dept: FAMILY MEDICINE CLINIC | Facility: TELEHEALTH | Age: 40
End: 2025-07-14

## 2025-07-14 DIAGNOSIS — R22.0 LIP SWELLING: Primary | ICD-10-CM

## 2025-07-14 NOTE — PROGRESS NOTES
Subjective   Sarita Pace is a 39 y.o. female.     History of Present Illness  She presents with c/o painful bumps/blisters on her lips and her lips feel mildly swollen. This has been going on for 3 days and has been getting worse. Her lips do feel slightly tingling or burning. She has had this happen before and it lasted about a week and then went away. She has been using aquaphor on her lips but has not tried any other treatments.  She does work in housekeeping.  She has been using retinol on her face and that is new for her. She also has eaten moira which is out of the norm for her.       The following portions of the patient's history were reviewed and updated as appropriate: allergies, current medications, past family history, past medical history, past social history, past surgical history, and problem list.    Review of Systems   Constitutional:  Negative for fever.   HENT:  Positive for facial swelling (lips). Negative for mouth sores, rhinorrhea, sore throat and swollen glands.        Objective   Physical Exam  Constitutional:       General: She is not in acute distress.     Appearance: She is well-developed. She is not diaphoretic.   HENT:      Mouth/Throat:      Lips: Lesions (small blisters) present.      Mouth: No oral lesions.      Comments: Lips are dry  Pulmonary:      Effort: Pulmonary effort is normal.   Neurological:      Mental Status: She is alert and oriented to person, place, and time.   Psychiatric:         Behavior: Behavior normal.           Assessment & Plan   Diagnoses and all orders for this visit:    1. Lip swelling (Primary)      May be allergy related or a contact dermatitis  Take benadryl 50mg q4h  Use vaseline or skin protectant prior to retinol use and avoid getting retinol near lips- decrease use of retinol as it is drying and this may be irritating your lips           The use of a video visit has been reviewed with the patient and verbal informed consent has been  obtained. Myself and Sarita Pace participated in this visit. The patient is located in [place], [place]. I am located in [place], [place]. [employer] and [employer] Video Client were utilized. I spent 20 minutes in the patient's chart for this visit.

## 2025-07-14 NOTE — PATIENT INSTRUCTIONS
Take benadryl 50mg q4h  Use vaseline or skin protectant prior to retinol use and avoid getting retinol near lips- decrease use of retinol as it is drying and this may be irritating your lips

## (undated) DEVICE — HYBRID CO2 TUBING/CAP SET FOR OLYMPUS® SCOPES & CO2 SOURCE: Brand: ERBE

## (undated) DEVICE — Device

## (undated) DEVICE — CONMED SCOPE SAVER BITE BLOCK, 20X27 MM: Brand: SCOPE SAVER

## (undated) DEVICE — VLV SXN AIR/H2O ORCAPOD3 1P/U STRL

## (undated) DEVICE — FRCP BX RADJAW4 NDL 2.8 240 STD OG

## (undated) DEVICE — ENDOSCOPY PORT CONNECTOR FOR OLYMPUS® SCOPES: Brand: ERBE